# Patient Record
Sex: FEMALE | Race: WHITE | URBAN - NONMETROPOLITAN AREA
[De-identification: names, ages, dates, MRNs, and addresses within clinical notes are randomized per-mention and may not be internally consistent; named-entity substitution may affect disease eponyms.]

---

## 2019-08-21 ENCOUNTER — OFFICE VISIT (OUTPATIENT)
Dept: PSYCHIATRY | Age: 34
End: 2019-08-21
Payer: COMMERCIAL

## 2019-08-21 DIAGNOSIS — F41.1 GAD (GENERALIZED ANXIETY DISORDER): ICD-10-CM

## 2019-08-21 DIAGNOSIS — F34.1 PERSISTENT DEPRESSIVE DISORDER: Primary | ICD-10-CM

## 2019-08-21 PROCEDURE — 99203 OFFICE O/P NEW LOW 30 MIN: CPT | Performed by: NURSE PRACTITIONER

## 2019-08-21 NOTE — PROGRESS NOTES
Subjective:      Patient ID: Genie Mccauley is a 27 y.o. female. CC: Persistent Depressive D/O  AYO    HPI   Rajiv Valentino is seen for initial mental health evaluation for medication management. Reports being switched from Zoloft to Lexapro  by PCP 4 weeks ago. Rajiv Valentino did not feel the Zoloft was working. Zoloft was at 25mg daily dose. Reports she really likes the Lexapro. And feels works well for her depression and anxiety she has dealt with off and on for years. Denies feelings of harm towards self or others. Reports appetite is fair. But reports she \"binge Eats. \"  Reports sleeps well. States she would like counseling for this. Labs reviewed drawn 1-8-19 reflect no critical abnormals. Reports labs were reviewed by PCP. Past Medical Hx:  Reports allergy to Lisinopril. Denies any other known drug allergies  Reports having HTN, PCOS; Endometriosis, Migraine Headaches, Graves Disease  Reports being on Mirena for [facility]    Past Psychiatric Hx:  Denies any past psychiatric hospitalizations. Denies any past suicidal gestures. Denies ever being under care of psychiatrist. Reports did have counseling in past 2018 in [place], [place]. Reports past psych meds Rxed by PCP; Zoloft. Currently on Lexapro 10mg po q am for 4 weeks    Family Hx:  Reports father has anxiety    Social Hx:  TOBACCO: Denies use of tobacco products. ETOH: Reports drinks 1 or 2 drinks once a month  DRUGS: Reports used week recreational in college has not used for years. Denies use of any other illicit substances   MARITAL STATUS: Reports being  x5 years first marriage. Reports she and spouse get along well.    OCCUPATION: Reports works for [employer]   LEVEL OF EDUCATION: Reports has bachelors Degree in Agriculture  LIVING SITUATION: Lives in [place], [place] with  and their 2 children 3and 3years old  LEGAL:Denies any current or past legal issues     MSE:  Level of consciousness: Alert  Appearance: in chair and fair grooming

## 2019-09-25 ENCOUNTER — OFFICE VISIT (OUTPATIENT)
Dept: PSYCHIATRY | Age: 34
End: 2019-09-25
Payer: COMMERCIAL

## 2019-09-25 DIAGNOSIS — F34.1 PERSISTENT DEPRESSIVE DISORDER: Primary | ICD-10-CM

## 2019-09-25 DIAGNOSIS — E66.01 MORBID OBESITY (HCC): ICD-10-CM

## 2019-09-25 DIAGNOSIS — F41.9 ANXIETY DISORDER, UNSPECIFIED TYPE: ICD-10-CM

## 2019-09-25 DIAGNOSIS — F50.89 OTHER SPECIFIED EATING DISORDER: Primary | ICD-10-CM

## 2019-09-25 PROCEDURE — 99213 OFFICE O/P EST LOW 20 MIN: CPT | Performed by: NURSE PRACTITIONER

## 2019-09-25 PROCEDURE — 90791 PSYCH DIAGNOSTIC EVALUATION: CPT | Performed by: PSYCHOLOGIST

## 2019-09-26 ENCOUNTER — NURSE ONLY (OUTPATIENT)
Dept: LAB | Age: 34
End: 2019-09-26

## 2019-09-26 DIAGNOSIS — F34.1 PERSISTENT DEPRESSIVE DISORDER: ICD-10-CM

## 2019-09-26 LAB
ALBUMIN SERPL-MCNC: 4.4 G/DL (ref 3.5–5.1)
ALP BLD-CCNC: 75 U/L (ref 38–126)
ALT SERPL-CCNC: 21 U/L (ref 11–66)
ANION GAP SERPL CALCULATED.3IONS-SCNC: 17 MEQ/L (ref 8–16)
AST SERPL-CCNC: 21 U/L (ref 5–40)
AVERAGE GLUCOSE: 96 MG/DL (ref 70–126)
BASOPHILS # BLD: 0.4 %
BASOPHILS ABSOLUTE: 0 THOU/MM3 (ref 0–0.1)
BILIRUB SERPL-MCNC: 0.3 MG/DL (ref 0.3–1.2)
BUN BLDV-MCNC: 9 MG/DL (ref 7–22)
CALCIUM SERPL-MCNC: 9.3 MG/DL (ref 8.5–10.5)
CHLORIDE BLD-SCNC: 104 MEQ/L (ref 98–111)
CHOLESTEROL, TOTAL: 197 MG/DL (ref 100–199)
CO2: 20 MEQ/L (ref 23–33)
CREAT SERPL-MCNC: 0.8 MG/DL (ref 0.4–1.2)
EOSINOPHIL # BLD: 2.1 %
EOSINOPHILS ABSOLUTE: 0.1 THOU/MM3 (ref 0–0.4)
ERYTHROCYTE [DISTWIDTH] IN BLOOD BY AUTOMATED COUNT: 12.7 % (ref 11.5–14.5)
ERYTHROCYTE [DISTWIDTH] IN BLOOD BY AUTOMATED COUNT: 41 FL (ref 35–45)
GFR SERPL CREATININE-BSD FRML MDRD: 81 ML/MIN/1.73M2
GLUCOSE FASTING: 103 MG/DL (ref 70–108)
HBA1C MFR BLD: 5.2 % (ref 4.4–6.4)
HCT VFR BLD CALC: 43.4 % (ref 37–47)
HDLC SERPL-MCNC: 51 MG/DL
HEMOGLOBIN: 13.9 GM/DL (ref 12–16)
IMMATURE GRANS (ABS): 0.01 THOU/MM3 (ref 0–0.07)
IMMATURE GRANULOCYTES: 0.2 %
LDL CHOLESTEROL CALCULATED: 111 MG/DL
LYMPHOCYTES # BLD: 33.6 %
LYMPHOCYTES ABSOLUTE: 1.7 THOU/MM3 (ref 1–4.8)
MCH RBC QN AUTO: 28.4 PG (ref 26–33)
MCHC RBC AUTO-ENTMCNC: 32 GM/DL (ref 32.2–35.5)
MCV RBC AUTO: 88.6 FL (ref 81–99)
MONOCYTES # BLD: 7.4 %
MONOCYTES ABSOLUTE: 0.4 THOU/MM3 (ref 0.4–1.3)
NUCLEATED RED BLOOD CELLS: 0 /100 WBC
PLATELET # BLD: 200 THOU/MM3 (ref 130–400)
PMV BLD AUTO: 10.8 FL (ref 9.4–12.4)
POTASSIUM SERPL-SCNC: 4 MEQ/L (ref 3.5–5.2)
RBC # BLD: 4.9 MILL/MM3 (ref 4.2–5.4)
SEG NEUTROPHILS: 56.3 %
SEGMENTED NEUTROPHILS ABSOLUTE COUNT: 2.9 THOU/MM3 (ref 1.8–7.7)
SODIUM BLD-SCNC: 141 MEQ/L (ref 135–145)
TOTAL PROTEIN: 7.5 G/DL (ref 6.1–8)
TRIGL SERPL-MCNC: 173 MG/DL (ref 0–199)
TSH SERPL DL<=0.05 MIU/L-ACNC: 4.6 UIU/ML (ref 0.4–4.2)
VITAMIN D 25-HYDROXY: 18 NG/ML (ref 30–100)
WBC # BLD: 5.2 THOU/MM3 (ref 4.8–10.8)

## 2019-10-01 PROBLEM — F50.89 OTHER SPECIFIED EATING DISORDER: Status: ACTIVE | Noted: 2019-10-01

## 2019-10-01 PROBLEM — F41.9 ANXIETY DISORDER: Status: ACTIVE | Noted: 2019-10-01

## 2019-10-09 ENCOUNTER — OFFICE VISIT (OUTPATIENT)
Dept: PSYCHIATRY | Age: 34
End: 2019-10-09
Payer: COMMERCIAL

## 2019-10-09 DIAGNOSIS — E66.01 MORBID OBESITY (HCC): ICD-10-CM

## 2019-10-09 DIAGNOSIS — F41.9 ANXIETY DISORDER, UNSPECIFIED TYPE: ICD-10-CM

## 2019-10-09 DIAGNOSIS — F50.89 OTHER SPECIFIED EATING DISORDER: Primary | ICD-10-CM

## 2019-10-09 PROCEDURE — 90834 PSYTX W PT 45 MINUTES: CPT | Performed by: PSYCHOLOGIST

## 2019-10-13 PROBLEM — R10.9 ABDOMINAL PAIN: Status: ACTIVE | Noted: 2019-10-13

## 2019-10-13 PROBLEM — O24.419 GESTATIONAL DIABETES MELLITUS (GDM) IN THIRD TRIMESTER: Status: ACTIVE | Noted: 2018-01-12

## 2019-10-13 PROBLEM — E78.5 HYPERLIPIDEMIA: Status: ACTIVE | Noted: 2019-10-13

## 2019-10-13 PROBLEM — E03.9 HYPOTHYROIDISM: Status: ACTIVE | Noted: 2019-10-13

## 2019-10-13 PROBLEM — H69.90 EUSTACHIAN TUBE DISORDER: Status: ACTIVE | Noted: 2019-10-13

## 2019-10-13 PROBLEM — I10 ESSENTIAL HYPERTENSION: Status: ACTIVE | Noted: 2019-10-13

## 2019-10-13 PROBLEM — E05.00 GRAVES' DISEASE: Status: ACTIVE | Noted: 2019-07-23

## 2019-10-13 PROBLEM — F41.1 GENERALIZED ANXIETY DISORDER: Status: ACTIVE | Noted: 2017-11-13

## 2019-10-30 ENCOUNTER — OFFICE VISIT (OUTPATIENT)
Dept: PSYCHIATRY | Age: 34
End: 2019-10-30
Payer: COMMERCIAL

## 2019-10-30 DIAGNOSIS — F34.1 PERSISTENT DEPRESSIVE DISORDER: Primary | ICD-10-CM

## 2019-10-30 DIAGNOSIS — F41.1 GAD (GENERALIZED ANXIETY DISORDER): ICD-10-CM

## 2019-10-30 PROCEDURE — 99213 OFFICE O/P EST LOW 20 MIN: CPT | Performed by: NURSE PRACTITIONER

## 2019-10-30 RX ORDER — ESCITALOPRAM OXALATE 20 MG/1
20 TABLET ORAL DAILY
Qty: 30 TABLET | Refills: 1 | Status: SHIPPED | OUTPATIENT
Start: 2019-10-30 | End: 2019-12-04 | Stop reason: SDUPTHER

## 2019-11-04 ENCOUNTER — OFFICE VISIT (OUTPATIENT)
Dept: PSYCHIATRY | Age: 34
End: 2019-11-04
Payer: COMMERCIAL

## 2019-11-04 DIAGNOSIS — F60.5 OBSESSIVE COMPULSIVE PERSONALITY DISORDER (HCC): Primary | ICD-10-CM

## 2019-11-04 DIAGNOSIS — E66.01 MORBID OBESITY (HCC): ICD-10-CM

## 2019-11-04 DIAGNOSIS — F50.89 OTHER SPECIFIED EATING DISORDER: ICD-10-CM

## 2019-11-04 PROCEDURE — 90834 PSYTX W PT 45 MINUTES: CPT | Performed by: PSYCHOLOGIST

## 2019-12-04 ENCOUNTER — OFFICE VISIT (OUTPATIENT)
Dept: PSYCHIATRY | Age: 34
End: 2019-12-04
Payer: COMMERCIAL

## 2019-12-04 DIAGNOSIS — F41.1 GAD (GENERALIZED ANXIETY DISORDER): ICD-10-CM

## 2019-12-04 DIAGNOSIS — F50.89 OTHER SPECIFIED EATING DISORDER: ICD-10-CM

## 2019-12-04 DIAGNOSIS — F34.1 PERSISTENT DEPRESSIVE DISORDER: ICD-10-CM

## 2019-12-04 DIAGNOSIS — F60.5 OBSESSIVE COMPULSIVE PERSONALITY DISORDER (HCC): Primary | ICD-10-CM

## 2019-12-04 PROBLEM — R10.9 ABDOMINAL PAIN: Status: RESOLVED | Noted: 2019-10-13 | Resolved: 2019-12-04

## 2019-12-04 PROCEDURE — 99213 OFFICE O/P EST LOW 20 MIN: CPT | Performed by: NURSE PRACTITIONER

## 2019-12-04 PROCEDURE — 90834 PSYTX W PT 45 MINUTES: CPT | Performed by: PSYCHOLOGIST

## 2019-12-04 RX ORDER — ESCITALOPRAM OXALATE 20 MG/1
20 TABLET ORAL DAILY
Qty: 30 TABLET | Refills: 2 | Status: SHIPPED | OUTPATIENT
Start: 2019-12-04 | End: 2020-01-15

## 2020-01-03 ENCOUNTER — TELEPHONE (OUTPATIENT)
Dept: PSYCHIATRY | Age: 35
End: 2020-01-03

## 2020-01-04 NOTE — TELEPHONE ENCOUNTER
Tell her decrease dose of Lexapro to 10mg take half tab until seen by me. On Jan 15th. If problems worsen tell her to go to ER.

## 2020-01-15 ENCOUNTER — OFFICE VISIT (OUTPATIENT)
Dept: PSYCHIATRY | Age: 35
End: 2020-01-15
Payer: COMMERCIAL

## 2020-01-15 PROCEDURE — 99213 OFFICE O/P EST LOW 20 MIN: CPT | Performed by: NURSE PRACTITIONER

## 2020-01-15 RX ORDER — FLUOXETINE 10 MG/1
10 CAPSULE ORAL DAILY
Qty: 30 CAPSULE | Refills: 1 | Status: SHIPPED | OUTPATIENT
Start: 2020-01-15 | End: 2020-02-05 | Stop reason: SDUPTHER

## 2020-01-15 NOTE — PROGRESS NOTES
substances   MARITAL STATUS: Reports being  x5 years first marriage. Reports she and spouse get along well.   OCCUPATION: Reports works for "[employer] has bachelors Degree in [facility] in [place], [place] with  and their 2 children 3and 3years old  LEGAL:Denies any current or past legal issues      MSE:  Level of consciousness: Alert  Appearance: in chair and fair grooming   Behavior/Motor: no abnormalities noted   Attitude toward examiner: cooperative   Speech: Normal volume, goal directed, NRR  Mood: Euthymic  Affect: Reactive  Thought processes: Linear and goal directed   Suicidal Ideation: Denies suicidal ideations  Homicidal ideation: Denies homicidal ideations  Delusions: No evidence of delusions is observed  Perceptual Disturbance: Denies AH/VH;  No evidence of psychosis is observed. Cognition: Oriented to person, place, time and situation   Concentration fair   Memory intact   Insight: Fair  Judgment: Fair     ROS:  Constitutional:  He appears well-developed and well-nourished, no acute distress  HENT:   Head: Normocephalic and atraumatic. Negative for ear pain, congestion, rhinorrhea and neck pain. Eyes: Conjunctivae are normal. Right eye exhibits no discharge. Left eye exhibits no discharge. No scleral icterus. Pulmonary/Chest:  No respiratory distress or accessory muscle use, no wheezing. Musculoskeletal: Normal range of motion. He exhibits no edema.  Negative for myalgias, back pain and arthralgias. Neurological: cranial nerves II-XII grossly in tact, normal gait and station. Negative for dizziness, weakness or headaches. Skin: Skin appears  dry.  is not diaphoretic. No erythema. Cardiovascular: Negative for chest pain, palpitations and leg swelling. Gastrointestinal: Negative for nausea, vomiting and diarrhea.    Genitourinary: Negative for dysuria and frequency.      Impression:  Persistent Depressive D/O  AYO  OCD     Plan:  D/C Lexapro  Start Prozac 10mg po q am for obsessions and ruminating thoughts. Med education given. Client voiced understanding   Advised to continue individual counseling. Will provide letter to request position switch.    RTC 4 weeks   Review of Systems    Objective:   Physical Exam    Assessment:          Plan:              EDITH Williamson - CNP

## 2020-02-05 ENCOUNTER — OFFICE VISIT (OUTPATIENT)
Dept: PSYCHIATRY | Age: 35
End: 2020-02-05
Payer: COMMERCIAL

## 2020-02-05 PROCEDURE — 90834 PSYTX W PT 45 MINUTES: CPT | Performed by: PSYCHOLOGIST

## 2020-02-05 PROCEDURE — 99213 OFFICE O/P EST LOW 20 MIN: CPT | Performed by: NURSE PRACTITIONER

## 2020-02-05 RX ORDER — FLUOXETINE 10 MG/1
10 CAPSULE ORAL DAILY
Qty: 30 CAPSULE | Refills: 1 | Status: SHIPPED | OUTPATIENT
Start: 2020-02-05 | End: 2020-04-20 | Stop reason: DRUGHIGH

## 2020-02-05 NOTE — PROGRESS NOTES
Subjective:      Patient ID: Isaac Peres is a 27 y.o. female. CC: Persistent Depressive D/O  AYO  OCD      HPI  Karol Valverde is seen for F/U  for medication management.   Reports start of Prozac really helped with  obsessive and ruminating  thoughts. Denies side effects from med. Good med compliance is reported. Denies depression.  Denies feelings of harm towards self or others.  Reports appetite is fair. Reports stress at work is much less since getting a less stressful position as reasonable accomadation letter was given.   Reports still seeing  Dr Angelica Yusuf in counseling. Reports still binge eating. Labs reviewed drawn 10- reflect elevated TSH at 4.600. Also Low Vitamin D level at 18. Reports these issues are being addressed by endocrinologist and PCP. Reports continues on Vit. D      It is to be noted client asked for off work letter which we discussed at length and was felt by this provider would be counter productive to her treatment. Client then stated she may have to get another provider as she has difficulties finding a .      Past Medical Hx:  Reports allergy to Lisinopril. Denies any other known drug allergies  Reports having HTN, PCOS; Endometriosis, Migraine Headaches, Graves Disease  Reports being on Mirena for [facility]     Past Psychiatric Hx:  Denies any past psychiatric hospitalizations. Denies any past suicidal gestures. Denies ever being under care of psychiatrist. Reports did have counseling in past 2018 in [place], [place]. Reports past psych meds Rxed by PCP; Zoloft. Currently on Lexapro 10mg po q am for 4 weeks     Family Hx:  Reports father has anxiety     Social Hx:  TOBACCO: Denies use of tobacco products.   ETOH: Reports drinks 1 or 2 drinks once a month  DRUGS: Reports used week recreational in college has not used for years. Denies use of any other illicit substances   MARITAL STATUS: Reports being  x5 years first marriage.  Reports she and spouse get along well.   OCCUPATION: Reports

## 2020-02-06 NOTE — PROGRESS NOTES
PSYCHOLOGICAL FOLLOW-UP FOR binge eating      History of Presenting Illness:   Kaushik Alexis a 48-year-old, female, referred for a psychiatric evaluation for binge eating by Jennie Luna CNP.  Today is her third follow up session.     Assessment:  Patient reports that she recently started Prozac and has found this useful. She reports she overall feels better, has less anger, and feels less detached from daily living. She reports she is coping by writing in her journal (good and bad days), and reports this helps her brain feel \"less full. \" She also reports that her binge eating episodes are continuing to improve. Patient reports starting Noom, psychologically based weight loss patience, and checks in daily with her . She reports this helps hold her more accountable. Patient reports there is overlap between Noom and psychoeducation provided during psychotherapy sessions. Patient reports she is now able to stop and think about what she is eating to help avoid binges, and over eating. She has also started to log her food intake before she eats and finds this useful. Patient reports having a goal to where the same bathing suit she did on her honeymoon, and feels that this is a realistic goal for her in approximately 1 year. Additionally, she reports she is trying to recognize and set up appropriate boundaries so she does not feel overworked, and \"tapped out. \"     MENTAL STATUS EXAM   General appearance: dressed appropriately, well-groomed  Attitude & Christyne Asper and cooperative  Motor Activity & Musculoskeletal: no abnormal movements  Speech & Language: normal rate, volume, and prosody  Gait & Station: normal  Mood: somewhat sad  Affect: congruent with mood, appropriate to setting  Thought content: appropriate  Suicidal ideation: denies  Homicidal ideation: denies  Thought process & Associations: logical, goal oriented  Perceptions: normal  Orientation: to person, place, and time  Attention &

## 2020-04-20 ENCOUNTER — VIRTUAL VISIT (OUTPATIENT)
Dept: PSYCHIATRY | Age: 35
End: 2020-04-20
Payer: COMMERCIAL

## 2020-04-20 PROCEDURE — 90792 PSYCH DIAG EVAL W/MED SRVCS: CPT | Performed by: NURSE PRACTITIONER

## 2020-04-20 RX ORDER — FLUOXETINE HYDROCHLORIDE 20 MG/1
20 CAPSULE ORAL DAILY
Qty: 30 CAPSULE | Refills: 0 | Status: SHIPPED | OUTPATIENT
Start: 2020-04-20 | End: 2020-05-18 | Stop reason: SDUPTHER

## 2020-04-20 RX ORDER — HYDROXYZINE PAMOATE 25 MG/1
25 CAPSULE ORAL 3 TIMES DAILY PRN
Qty: 30 CAPSULE | Refills: 0 | Status: SHIPPED | OUTPATIENT
Start: 2020-04-20 | End: 2020-05-18 | Stop reason: SDUPTHER

## 2020-04-20 RX ORDER — LABETALOL 100 MG/1
100 TABLET, FILM COATED ORAL 2 TIMES DAILY
COMMUNITY

## 2020-04-20 SDOH — ECONOMIC STABILITY: TRANSPORTATION INSECURITY
IN THE PAST 12 MONTHS, HAS THE LACK OF TRANSPORTATION KEPT YOU FROM MEDICAL APPOINTMENTS OR FROM GETTING MEDICATIONS?: NO

## 2020-04-20 SDOH — ECONOMIC STABILITY: FOOD INSECURITY: WITHIN THE PAST 12 MONTHS, YOU WORRIED THAT YOUR FOOD WOULD RUN OUT BEFORE YOU GOT MONEY TO BUY MORE.: NEVER TRUE

## 2020-04-20 SDOH — ECONOMIC STABILITY: INCOME INSECURITY: HOW HARD IS IT FOR YOU TO PAY FOR THE VERY BASICS LIKE FOOD, HOUSING, MEDICAL CARE, AND HEATING?: NOT HARD AT ALL

## 2020-04-20 SDOH — ECONOMIC STABILITY: FOOD INSECURITY: WITHIN THE PAST 12 MONTHS, THE FOOD YOU BOUGHT JUST DIDN'T LAST AND YOU DIDN'T HAVE MONEY TO GET MORE.: NEVER TRUE

## 2020-04-20 SDOH — ECONOMIC STABILITY: TRANSPORTATION INSECURITY
IN THE PAST 12 MONTHS, HAS LACK OF TRANSPORTATION KEPT YOU FROM MEETINGS, WORK, OR FROM GETTING THINGS NEEDED FOR DAILY LIVING?: NO
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
## 2020-05-05 ENCOUNTER — VIRTUAL VISIT (OUTPATIENT)
Dept: PSYCHIATRY | Age: 35
End: 2020-05-05
Payer: COMMERCIAL

## 2020-05-05 PROCEDURE — 90834 PSYTX W PT 45 MINUTES: CPT | Performed by: PSYCHOLOGIST

## 2020-05-18 ENCOUNTER — VIRTUAL VISIT (OUTPATIENT)
Dept: PSYCHIATRY | Age: 35
End: 2020-05-18
Payer: COMMERCIAL

## 2020-05-18 PROCEDURE — 90833 PSYTX W PT W E/M 30 MIN: CPT | Performed by: NURSE PRACTITIONER

## 2020-05-18 PROCEDURE — 99213 OFFICE O/P EST LOW 20 MIN: CPT | Performed by: NURSE PRACTITIONER

## 2020-05-18 RX ORDER — FLUOXETINE HYDROCHLORIDE 20 MG/1
20 CAPSULE ORAL DAILY
Qty: 30 CAPSULE | Refills: 1 | Status: SHIPPED | OUTPATIENT
Start: 2020-05-18 | End: 2020-07-15 | Stop reason: SDUPTHER

## 2020-05-18 RX ORDER — HYDROXYZINE PAMOATE 25 MG/1
25 CAPSULE ORAL 3 TIMES DAILY PRN
Qty: 90 CAPSULE | Refills: 1 | Status: SHIPPED | OUTPATIENT
Start: 2020-05-18 | End: 2020-11-18 | Stop reason: SDUPTHER

## 2020-05-22 ENCOUNTER — VIRTUAL VISIT (OUTPATIENT)
Dept: PSYCHIATRY | Age: 35
End: 2020-05-22
Payer: COMMERCIAL

## 2020-05-22 PROCEDURE — 90834 PSYTX W PT 45 MINUTES: CPT | Performed by: PSYCHOLOGIST

## 2020-06-09 ENCOUNTER — VIRTUAL VISIT (OUTPATIENT)
Dept: PSYCHIATRY | Age: 35
End: 2020-06-09
Payer: COMMERCIAL

## 2020-06-09 PROCEDURE — 90834 PSYTX W PT 45 MINUTES: CPT | Performed by: PSYCHOLOGIST

## 2020-06-09 NOTE — PROGRESS NOTES
Pursuant to the emergency declaration under the [facility], [facility] waiver authority and the [employer] Act, this Virtual Visit was conducted, with patient's consent, to reduce the patient's risk of exposure to COVID-19 and provide continuity of care for an established patient.    Services were provided through a video synchronous discussion virtually to substitute for in-person clinic visit.

## 2020-07-01 ENCOUNTER — VIRTUAL VISIT (OUTPATIENT)
Dept: PSYCHIATRY | Age: 35
End: 2020-07-01
Payer: COMMERCIAL

## 2020-07-01 PROCEDURE — 90832 PSYTX W PT 30 MINUTES: CPT | Performed by: PSYCHOLOGIST

## 2020-07-01 NOTE — PROGRESS NOTES
session. MENTAL STATUS EXAM   General appearance: dressed appropriately, well-groomed  Attitude & Behavior: pleasant and cooperative  Motor Activity & Musculoskeletal: no abnormal movements  Speech & Language: normal rate, volume, and prosody  Gait & Station: sitting  Mood: content   Affect: congruent with mood, appropriate to setting  Thought content: appropriate  Suicidal ideation: denies  Homicidal ideation: denies  Thought process & Associations: logical, goal oriented  Perceptions: normal  Orientation: to person, place, and time  Attention & Concentration: intact  Fund of knowledge: average  Insight: adequate  Judgment: adequate     DSM DIAGNOSIS:  Obsessive compulsive personality disorder  Other specified eating disorder, binge eating occurs less than once per week       INTERVENTION:   Provided positive reinforcement for patient making a plan and overall sticking to her plan. Provided positive reinforcement for her having a serious conversation with her . SUMMARY/PLAN:  Patient is actively engaged in session and is making progress. She has good insight into her thoughts and behaviors, and is able to verbalize her concerns. Patient scheduled an additional follow up on August 12 with writer.        Psychology Clinician:  Elizabeth Keith, PhD     Start time: 12:35pm  End time: 1:07pm

## 2020-07-15 ENCOUNTER — VIRTUAL VISIT (OUTPATIENT)
Dept: PSYCHIATRY | Age: 35
End: 2020-07-15
Payer: COMMERCIAL

## 2020-07-15 PROCEDURE — 99213 OFFICE O/P EST LOW 20 MIN: CPT | Performed by: NURSE PRACTITIONER

## 2020-07-15 PROCEDURE — 90836 PSYTX W PT W E/M 45 MIN: CPT | Performed by: NURSE PRACTITIONER

## 2020-07-15 RX ORDER — FLUOXETINE HYDROCHLORIDE 20 MG/1
20 CAPSULE ORAL DAILY
Qty: 30 CAPSULE | Refills: 2 | Status: SHIPPED | OUTPATIENT
Start: 2020-07-15 | End: 2020-10-07 | Stop reason: DRUGHIGH

## 2020-07-15 NOTE — PROGRESS NOTES
[address] [place] [address] [address] [facility] [address]  Dept: [phone]  Dept Fax: [phone]: [phone]    Visit Date: 7/15/2020     TELEPSYCHIATRY VISIT -- Audio/Visual (During CZMOH-89 public health emergency)    Chai Eason is a 38 y.o. female being evaluated by a Virtual Visit (video visit) encounter to address concerns as mentioned  below. A caregiver was present when appropriate. Pursuant to the emergency declaration under the [address] [place] [address], [address] [place] [address] authority and the [employer] Act, this Virtual Visit was conducted with patient's (and/or legal guardian's) consent, to reduce the patient's risk of exposure to COVID-19 and provide necessary medical care. The patient (and/or legal guardian) has also been advised to contact this office for worsening conditions or problems, and seek emergency medical treatment and/or call 911 if deemed necessary. Services were provided through a video synchronous discussion virtually to substitute for in-person clinic visit. Patient and provider were located at their individual homes. SUBJECTIVE DATA     CHIEF COMPLAINT:    Chief Complaint   Patient presents with    Depression    Anxiety    Follow-up       History obtained from: patient    HISTORY OF PRESENT ILLNESS:    Chai Eason is a 38 y.o. female who presents via tele-health audiovisual visit to follow-up on complaints of depression and anxiety. Her last visit was 05/18/2020 as a telehealth visit.     Patient states it is getting easier to identify triggers  -Easier to identify when she needs to take time  -Been reading more on PCOS and the impact on the body  -states she has recognized how cortisol releases at high levels during stress and this impacts mood    States she has begun to identify \"life values\"  -what matters to improve self and how to get to live life following these values  -she has been working on this with Dr. Argie Osgood  -states she is getting up earlier to give self some extra time to get ready and get up and get prepared for the day    States she has made a list of chores and a \"to-do\" list to help her stay focused and to help her be okay with taking time for self. States she has unrealistic expectations of her young children  -states \"I set myself up for failure and disappointment because I have unrealistic expectations. \"  -states this causes her to \"either loose my shit or shutting down\"   -admits she struggles with allowing self emotions and honoring the emotions of others  -When she gets upset she goes straight to anger, and this is very frustrating for her \"and I turn into a big pouting child\"    Has recognized a lot of negative behaviors and is able to stop them easier    Recognizes she was using anger to manage stress  -states she realized this when on a recent trip to her mother's house    Reading \"rising strong\" by Donna Merritt self day-dreaming a lot, which is leading to unrealistic expectations    Denies suicidal ideations, intent, plan. No homicidal ideations, intent, plan. No audiovisual hallucinations. HPI    Adverse reactions from psychotropic medications:  None current      Current Psychiatric Review of Systems         Tish or Hypomania:  None      Panic Attacks:  no     Phobias:  no     Obsessions and Compulsions:  no     Body or Vocal Tics:  no     Hallucinations:  no     Delusions:  no    SOCIAL HISTORY:  Patient was born in [place], [place] and raised by her biological parents      Social History     Socioeconomic History    Marital status:      Spouse name: Kelin Molina Number of children: 2    Years of education: Not on file    Highest education level:  Bachelor's degree (e.g., BA, AB, BS)   Occupational History    Occupation: [employer]      Comment: [employer] 9 worse while others improve mood        FAMILY HISTORY:   Family History   Problem Relation Age of Onset    Diabetes Mother     Hypertension Father     Heart Disease Father     Cancer Father         bladder    Mult Sclerosis Sister     Anxiety Disorder Paternal Grandfather     Bipolar Disorder Paternal Grandfather        Psychiatric Family History  As noted above    PAST MEDICAL HISTORY:    Past Medical History:   Diagnosis Date    Chronic back pain     Gestational diabetes     during 2nd pregnancy    Graves disease     Hypertension     Migraine     PCOS (polycystic ovarian syndrome)        PAST SURGICAL HISTORY:    Past Surgical History:   Procedure Laterality Date     SECTION      x2       PREVIOUSMEDICATIONS:  Outpatient Medications Prior to Visit   Medication Sig Dispense Refill    hydrOXYzine (VISTARIL) 25 MG capsule Take 1 capsule by mouth 3 times daily as needed for Anxiety 90 capsule 1    labetalol (NORMODYNE) 100 MG tablet Take 100 mg by mouth 2 times daily      FLUoxetine (PROZAC) 20 MG capsule Take 1 capsule by mouth daily 30 capsule 1     No facility-administered medications prior to visit. ALLERGIES:    Lisinopril    REVIEW OF SYSTEMS:    Review of Systems    The patient sees No primary care provider on file. as her primary care provider. SPECIALISTS: Endocrinologist (Grave's Disease)    OBJECTIVE DATA     There were no vitals taken for this visit. Physical Exam    Mental Status Evaluation:   Orientation: Alert, oriented, thought content appropriate   Mood:. Euthymic      Affect:  Normal      Appearance:  Age Appropriate, Casually Dressed, Overweight, Clean, Well Groomed, Clothing Appropriate for Age and Clothing Appropriate for Weather   Activity:  Cooperative, Good Eye Contact and Seated Calmly   Gait/Posture: Normal   Speech:  Clear, Fluent, Normal Pitch and Volume, Age and Situation Appropriate   Thought Process: Within Normal Limits   Thought Content:   Within Normal Limits   Cognition:  Grossly Intact   Memory: Intact   Insight:  Good   Judgment: Good   Suicidal Ideations: Denies Suicidal Ideation   Homicidal Ideations: Negative for homicidal ideation   Medication Side Effects: Absent       Attention Span Attention span and concentration were age appropriate       Screenings Completed in This Encounter:     Anxiety and Depression:                    DIAGNOSIS AND ASSESSMENT DATA     DIAGNOSIS:   1. Mild episode of recurrent major depressive disorder ([facility]    2. Generalized anxiety disorder    3. Obsessive compulsive personality disorder ([facility]      R/O Bipolar disorder; MDD with mixed features    PLAN   Follow-up:  Return in about 3 months (around 10/15/2020), or if symptoms worsen or fail to improve, for follow-up and medication management. Prescriptions for this encounter:  New Prescriptions    No medications on file       Orders Placed This Encounter   Medications    FLUoxetine (PROZAC) 20 MG capsule     Sig: Take 1 capsule by mouth daily     Dispense:  30 capsule     Refill:  2       Medications Discontinued During This Encounter   Medication Reason    FLUoxetine (PROZAC) 20 MG capsule REORDER       Additional orders:  No orders of the defined types were placed in this encounter. Patient is reporting again feeling better since her last visit. Tolerating medications well. No changes will be made. Refills are provided. Supportive therapy provided. Assisted patient in identifying strategies to review stressors and manage anxiety. She is encouraged to continue to participate in individual psychotherapy. Identified importance of recognizing realistic expectations. Patient is encouraged to utilize nonpharmacologic coping skills such as deep breathing, guided imagery, guided meditation, muscle relaxation, calming music, and/or journaling. Risks, potential side effects, possibledrug-drug interactions, benefits and alternate treatments discussed in detail. All questions answered. Patient stated understanding and is agreeable to treatment plan. Patient has been instructed to seek emergency help via the emergency and/or calling 911 should symptoms become severe, worsen, or with other concerning symptoms. Patient instructed to goimmediately to the emergency room and/or call 911 with any suicidal or homicidal ideations or if audio/visual hallucinations develop  Patient stated understanding and agrees. Patient given crisis center information. I spent a total of 38 minutes with the patient in counseling regarding topics discussed above. Utilized CBT, motivational interviewing, and reflective listening to address topics above. Patient responsive and engaged throughout session. The remainder session was spent on symptom evaluation and medication management. Provider Signature:  Electronically signed by EDITH Sims CNP on 7/15/2020 at 10:48 AM    **This report has been created using voice recognition software. It may contain minor errors which are inherent in voice recognition technology. **

## 2020-08-12 ENCOUNTER — VIRTUAL VISIT (OUTPATIENT)
Dept: PSYCHIATRY | Age: 35
End: 2020-08-12
Payer: COMMERCIAL

## 2020-08-12 PROCEDURE — 90834 PSYTX W PT 45 MINUTES: CPT | Performed by: PSYCHOLOGIST

## 2020-08-12 NOTE — PROGRESS NOTES
TELEPSYCHOLOGY VISIT -- Audio/Visual (During PTVCI-64 public health emergency)     This session was conducted as a telepsychology visit due to one or more of the following COVID-19 risk factors being present in this patient:  · The patient is aged 61 or older  · The patient reports chronic health problems  · The patient reports immune suppressed or immune compromised status  · The patient reports being at risk or potentially exposed to the virus     Patient Location: Home     Provider Location: [facility]      Patient gave verbal consent for teleservices.         This virtual visit was conducted via interactive/real-time [address] and eating behaviors     History of Presenting Illness:   Piper Esquivel a 37 year-old, female, referred for a psychiatric evaluation for binge eating by Faraz Haley CNP.  She is currently following with Marlena Hoyos CNP for medication management. She is following up with psychology to learn adaptive coping strategies to manage stress, mood, and binge eating. Assessment:  She reports the past week has been \"kind of rough\" related to work stress and is unsure how to interpret messages from her boss. She reports these statements have led to continued mistrust of her supervisors/management, and has led to a perceived drop in work performance. She reports feeling as though she is being targeted and it is negatively impacting her. She reports that she was previously removed from one office in 2018 and was told that she is not allowed to communicate with anyone from that office for supposed safety concerns. Eating behaviors: she reports she did engage in some emotional eating to the point of feeling full. She reports she was able to manage her food cravings and stopped herself from eating ice cream, even though she did have a craving for it. Mood: she reports her relationship with her children has significantly improved.   She reports she has had much more manageable levels of stress, and she has been enjoying family time. MENTAL STATUS EXAM   General appearance: dressed appropriately, well-groomed  Attitude & Behavior: pleasant and cooperative  Motor Activity & Musculoskeletal: no abnormal movements  Speech & Language: normal rate, volume, and prosody  Gait & Station: sitting  Mood: calm, some anger regarding work situation  Affect: congruent with mood, appropriate to setting  Thought content: appropriate  Suicidal ideation: denies  Homicidal ideation: denies  Thought process & Associations: logical, goal oriented  Perceptions: normal  Orientation: to person, place, and time  Attention & Concentration: intact  Fund of knowledge: average  Insight: adequate  Judgment: adequate     DSM DIAGNOSIS:  Obsessive compulsive personality disorder  Other specified eating disorder, binge eating occurs less than once per week       INTERVENTION:   Processed new work related situation and walked through problematic questions worksheet. Challenged patient's beliefs of possible change in status and the pros and cons of possible change in status. Patient is very concerned about her reputation throughout her employer. SUMMARY/PLAN:  Based on patient's concerns it was recommended that look into the [place] [employer], workplace discrimination unit. Writer provided patient with website to further investigate if this would be an appropriate option for her. Patient did schedule a follow up in approximately 6 weeks. Patient is in agreement with plan.      Psychology Clinician:  Chapis Rios, PhD     Start time: 12:30pm  End time: 1:20pm      Pursuant to the emergency declaration under the [facility], [facility] waiver authority and the [employer] Act, this Virtual Visit was conducted, with patient's consent, to reduce the patient's risk of exposure to COVID-19 and provide continuity of care for an established patient. Services were provided through a video synchronous discussion virtually to substitute for in-person clinic visit.

## 2020-09-23 ENCOUNTER — VIRTUAL VISIT (OUTPATIENT)
Dept: PSYCHIATRY | Age: 35
End: 2020-09-23
Payer: COMMERCIAL

## 2020-09-23 PROCEDURE — 90834 PSYTX W PT 45 MINUTES: CPT | Performed by: PSYCHOLOGIST

## 2020-09-23 NOTE — PROGRESS NOTES
TELEPSYCHOLOGY VISIT -- Audio/Visual (During ALUTC-21 public health emergency)     This session was conducted as a telepsychology visit due to one or more of the following COVID-19 risk factors being present in this patient:  · The patient is aged 61 or older  · The patient reports chronic health problems  · The patient reports immune suppressed or immune compromised status  · The patient reports being at risk or potentially exposed to the virus     Patient Location: Home     Provider Location: [facility]      Patient gave verbal consent for teleservices.         This virtual visit was conducted via interactive/real-time [address] and eating behaviors     History of Presenting Illness:   Rose Guzman a 37 year-old, female, referred for a psychiatric evaluation for binge eating by Faheem Wang CNP.  She is currently following with Arcadio Murray CNP for medication management. She is following up with psychology to learn adaptive coping strategies to manage stress, mood, and binge eating. Assessment:  She reports she has overall been doing well. She reports she did re-engage in [place] with a friend. She also had a meeting with the HR department at work. She reports this meeting was not useful. She reports it is very difficult for her to process these emotions. She reports she is continuing to meet obligations at work despite this. She reports she has been busy with construction of new home and she is excited for this. She reports this has also led to feeling divided with her spouse as every night her  is working on the house so she is alone. She reports she has felt \"pretty good\" over the past couple of days with work and daily life. She reports she has been using Vistaril more often as the end of the year at work wraps up. Psychiatric Medications: she reports these have been helpful.   She notices that she has been more stressed as of lately and has been managing by taking Vistaril more often. She reports that she can \"definitely\" tell that the Prozac has been helpful for her. She notices her mood swings are much worse in the morning if she does not take this medication. MENTAL STATUS EXAM   General appearance: dressed appropriately, well-groomed  Attitude & Behavior: pleasant and cooperative  Motor Activity & Musculoskeletal: no abnormal movements  Speech & Language: normal rate, volume, and prosody  Gait & Station: sitting  Mood: somewhat anxious with work   Affect: congruent with mood, appropriate to setting  Thought content: appropriate  Suicidal ideation: denies  Homicidal ideation: denies  Thought process & Associations: logical, goal oriented, coherent  Perceptions: normal  Orientation: to person, place, and time  Attention & Concentration: intact  Fund of knowledge: average  Insight: adequate  Judgment: adequate     DSM DIAGNOSIS:  Obsessive compulsive personality disorder  Other specified eating disorder, binge eating occurs less than once per week       INTERVENTION:   Provided positive reinforcement for healthy habits patient has been able to maintain such as making a list to alleviate stress. Patient is able to acknowledge that change in job position is going to be a good thing because it will be less work with same compensation. She appears to be more accepting of things that are out of her control. Discussed the ideas of generalization and specificity. SUMMARY/PLAN:   Patient appears to be more accepting of things that are out of her control. She is trying to continue to practice this idea but does have difficulties at times. She is motivated to continue to improve upon these behaviors. She has a follow up session scheduled with writer in December.         Psychology Clinician:  Chapis Rios, PhD     Start time: 10:00 am  End time: 10:40 am      Pursuant to the emergency declaration under the [facility] Act, 1135 waiver authority and the Coronavirus Preparedness and Response Supplemental Appropriations Act, this Virtual Visit was conducted, with patient's consent, to reduce the patient's risk of exposure to COVID-19 and provide continuity of care for an established patient. Services were provided through a video synchronous discussion virtually to substitute for in-person clinic visit.

## 2020-10-07 ENCOUNTER — VIRTUAL VISIT (OUTPATIENT)
Dept: PSYCHIATRY | Age: 35
End: 2020-10-07
Payer: COMMERCIAL

## 2020-10-07 PROCEDURE — 90833 PSYTX W PT W E/M 30 MIN: CPT | Performed by: NURSE PRACTITIONER

## 2020-10-07 PROCEDURE — 99214 OFFICE O/P EST MOD 30 MIN: CPT | Performed by: NURSE PRACTITIONER

## 2020-10-07 RX ORDER — FLUOXETINE HYDROCHLORIDE 40 MG/1
40 CAPSULE ORAL DAILY
Qty: 90 CAPSULE | Refills: 0 | Status: SHIPPED | OUTPATIENT
Start: 2020-10-07 | End: 2020-11-18 | Stop reason: SDUPTHER
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
## 2020-10-15 ENCOUNTER — NURSE ONLY (OUTPATIENT)
Dept: LAB | Age: 35
End: 2020-10-15

## 2020-11-18 ENCOUNTER — VIRTUAL VISIT (OUTPATIENT)
Dept: PSYCHIATRY | Age: 35
End: 2020-11-18
Payer: COMMERCIAL

## 2020-11-18 PROCEDURE — 99213 OFFICE O/P EST LOW 20 MIN: CPT | Performed by: NURSE PRACTITIONER

## 2020-11-18 PROCEDURE — 90833 PSYTX W PT W E/M 30 MIN: CPT | Performed by: NURSE PRACTITIONER

## 2020-11-18 RX ORDER — FLUOXETINE HYDROCHLORIDE 40 MG/1
40 CAPSULE ORAL DAILY
Qty: 90 CAPSULE | Refills: 0 | Status: SHIPPED | OUTPATIENT
Start: 2020-11-18 | End: 2021-02-10 | Stop reason: DRUGHIGH

## 2020-11-18 RX ORDER — HYDROXYZINE PAMOATE 25 MG/1
25 CAPSULE ORAL 3 TIMES DAILY PRN
Qty: 90 CAPSULE | Refills: 1 | Status: SHIPPED | OUTPATIENT
Start: 2020-11-18 | End: 2021-05-19 | Stop reason: SDUPTHER
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
## 2021-02-10 ENCOUNTER — VIRTUAL VISIT (OUTPATIENT)
Dept: PSYCHIATRY | Age: 36
End: 2021-02-10
Payer: COMMERCIAL

## 2021-02-10 DIAGNOSIS — F41.1 GAD (GENERALIZED ANXIETY DISORDER): ICD-10-CM

## 2021-02-10 DIAGNOSIS — F60.5 OBSESSIVE COMPULSIVE PERSONALITY DISORDER (HCC): ICD-10-CM

## 2021-02-10 DIAGNOSIS — F33.1 MODERATE EPISODE OF RECURRENT MAJOR DEPRESSIVE DISORDER (HCC): Primary | ICD-10-CM

## 2021-02-10 PROCEDURE — 90833 PSYTX W PT W E/M 30 MIN: CPT | Performed by: NURSE PRACTITIONER

## 2021-02-10 PROCEDURE — 99214 OFFICE O/P EST MOD 30 MIN: CPT | Performed by: NURSE PRACTITIONER

## 2021-02-10 RX ORDER — FLUOXETINE HYDROCHLORIDE 20 MG/1
60 CAPSULE ORAL DAILY
Qty: 90 CAPSULE | Refills: 1 | Status: SHIPPED | OUTPATIENT
Start: 2021-02-10 | End: 2021-03-24 | Stop reason: SDUPTHER
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
## 2021-03-24 ENCOUNTER — VIRTUAL VISIT (OUTPATIENT)
Dept: PSYCHIATRY | Age: 36
End: 2021-03-24
Payer: COMMERCIAL

## 2021-03-24 DIAGNOSIS — F60.5 OBSESSIVE COMPULSIVE PERSONALITY DISORDER (HCC): ICD-10-CM

## 2021-03-24 DIAGNOSIS — F33.1 MODERATE EPISODE OF RECURRENT MAJOR DEPRESSIVE DISORDER (HCC): Primary | ICD-10-CM

## 2021-03-24 DIAGNOSIS — F41.1 GAD (GENERALIZED ANXIETY DISORDER): ICD-10-CM

## 2021-03-24 PROCEDURE — 90833 PSYTX W PT W E/M 30 MIN: CPT | Performed by: NURSE PRACTITIONER

## 2021-03-24 PROCEDURE — 99214 OFFICE O/P EST MOD 30 MIN: CPT | Performed by: NURSE PRACTITIONER

## 2021-03-24 RX ORDER — FLUOXETINE HYDROCHLORIDE 20 MG/1
60 CAPSULE ORAL DAILY
Qty: 90 CAPSULE | Refills: 1 | Status: SHIPPED | OUTPATIENT
Start: 2021-03-24 | End: 2021-05-19 | Stop reason: SDUPTHER

## 2021-03-24 RX ORDER — BUSPIRONE HYDROCHLORIDE 15 MG/1
15 TABLET ORAL 2 TIMES DAILY
Qty: 60 TABLET | Refills: 1 | Status: SHIPPED | OUTPATIENT
Start: 2021-03-24 | End: 2021-05-19 | Stop reason: DRUGHIGH
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
## 2021-03-29 ENCOUNTER — VIRTUAL VISIT (OUTPATIENT)
Dept: PSYCHIATRY | Age: 36
End: 2021-03-29
Payer: COMMERCIAL

## 2021-03-29 DIAGNOSIS — F60.5 OBSESSIVE COMPULSIVE PERSONALITY DISORDER (HCC): Primary | ICD-10-CM

## 2021-03-29 PROCEDURE — 90837 PSYTX W PT 60 MINUTES: CPT | Performed by: PSYCHOLOGIST

## 2021-04-16 ENCOUNTER — VIRTUAL VISIT (OUTPATIENT)
Dept: PSYCHIATRY | Age: 36
End: 2021-04-16
Payer: COMMERCIAL

## 2021-04-16 DIAGNOSIS — F50.89 OTHER SPECIFIED EATING DISORDER: ICD-10-CM

## 2021-04-16 DIAGNOSIS — F60.5 OBSESSIVE COMPULSIVE PERSONALITY DISORDER (HCC): Primary | ICD-10-CM

## 2021-04-16 PROCEDURE — 90837 PSYTX W PT 60 MINUTES: CPT | Performed by: PSYCHOLOGIST
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
## 2021-05-12 ENCOUNTER — VIRTUAL VISIT (OUTPATIENT)
Dept: PSYCHIATRY | Age: 36
End: 2021-05-12
Payer: COMMERCIAL

## 2021-05-12 DIAGNOSIS — F60.5 OBSESSIVE COMPULSIVE PERSONALITY DISORDER (HCC): Primary | ICD-10-CM

## 2021-05-12 DIAGNOSIS — F50.89 OTHER SPECIFIED EATING DISORDER: ICD-10-CM

## 2021-05-12 PROCEDURE — 90832 PSYTX W PT 30 MINUTES: CPT | Performed by: PSYCHOLOGIST

## 2021-05-19 ENCOUNTER — VIRTUAL VISIT (OUTPATIENT)
Dept: PSYCHIATRY | Age: 36
End: 2021-05-19
Payer: COMMERCIAL

## 2021-05-19 DIAGNOSIS — F33.0 MILD EPISODE OF RECURRENT MAJOR DEPRESSIVE DISORDER (HCC): Primary | ICD-10-CM

## 2021-05-19 DIAGNOSIS — F41.1 GAD (GENERALIZED ANXIETY DISORDER): ICD-10-CM

## 2021-05-19 DIAGNOSIS — F60.5 OBSESSIVE COMPULSIVE PERSONALITY DISORDER (HCC): ICD-10-CM

## 2021-05-19 PROCEDURE — 90833 PSYTX W PT W E/M 30 MIN: CPT | Performed by: NURSE PRACTITIONER

## 2021-05-19 PROCEDURE — 99214 OFFICE O/P EST MOD 30 MIN: CPT | Performed by: NURSE PRACTITIONER

## 2021-05-19 RX ORDER — FLUOXETINE HYDROCHLORIDE 20 MG/1
60 CAPSULE ORAL DAILY
Qty: 90 CAPSULE | Refills: 1 | Status: SHIPPED | OUTPATIENT
Start: 2021-05-19 | End: 2021-07-21 | Stop reason: SDUPTHER

## 2021-05-19 RX ORDER — BUSPIRONE HYDROCHLORIDE 15 MG/1
15 TABLET ORAL 3 TIMES DAILY
Qty: 90 TABLET | Refills: 1 | Status: SHIPPED | OUTPATIENT
Start: 2021-05-19 | End: 2021-07-21 | Stop reason: SDUPTHER

## 2021-05-19 RX ORDER — HYDROXYZINE PAMOATE 25 MG/1
25 CAPSULE ORAL 3 TIMES DAILY PRN
Qty: 90 CAPSULE | Refills: 1 | Status: SHIPPED | OUTPATIENT
Start: 2021-05-19 | End: 2021-07-21 | Stop reason: SDUPTHER

## 2021-05-19 NOTE — PROGRESS NOTES
[address] [place] [address] [address] [id number]  Dept: [phone]  Dept Fax: [phone]: [phone]    Visit Date: 5/19/2021     TELEPSYCHIATRY VISIT -- Audio/Visual (During PDTLK-47 public health emergency)    Kell Tyler is a 39 y.o. female being evaluated by a Virtual Visit (video visit) encounter to address concerns as mentioned  below. A caregiver was present when appropriate. Pursuant to the emergency declaration under the [address] [place] [address], [address] [place] [address] authority and the [employer] Act, this Virtual Visit was conducted with patient's (and/or legal guardian's) consent, to reduce the patient's risk of exposure to COVID-19 and provide necessary medical care. The patient (and/or legal guardian) has also been advised to contact this office for worsening conditions or problems, and seek emergency medical treatment and/or call 911 if deemed necessary. Services were provided through a video synchronous discussion virtually to substitute for in-person clinic visit. Patient and provider were located at their individual homes. SUBJECTIVE DATA     CHIEF COMPLAINT:    Chief Complaint   Patient presents with    Anxiety    Depression    Follow-up       History obtained from: patient    HISTORY OF PRESENT ILLNESS:    Kell Tyler is a 39 y.o. female who presents via tele-health audiovisual visit to follow-up on complaints of depression and anxiety. Her last visit was 03/24/2021 as a telehealth visit.     Some days good, some days not so good  -still with ups and downs  -ruminating thoughts are much better  -not dwelling as much  -states she still feel overwhelming feelings of having too much to do  -tries to avoid and hide from her feelings and responsibilities  -still isolates self at times  -reports the BuSpar is very helpful; it is helping her keep  Active Member of Clubs or Organizations:     Attends Club or Organization Meetings:     Marital Status:    Intimate Partner Violence:     Fear of Current or Ex-Partner:     Emotionally Abused:     Physically Abused:     Sexually Abused:        FAMILY HISTORY:   Family History   Problem Relation Age of Onset    Diabetes Mother     Hypertension Father     Heart Disease Father     Cancer Father         bladder    Mult Sclerosis Sister     Anxiety Disorder Paternal Grandfather     Bipolar Disorder Paternal Grandfather        Psychiatric Family History  As noted above    PAST MEDICAL HISTORY:    Past Medical History:   Diagnosis Date    Chronic back pain     Gestational diabetes     during 2nd pregnancy    Graves disease     Hypertension     Migraine     PCOS (polycystic ovarian syndrome)        PAST SURGICAL HISTORY:    Past Surgical History:   Procedure Laterality Date     SECTION      x2       PREVIOUSMEDICATIONS:  Outpatient Medications Prior to Visit   Medication Sig Dispense Refill    labetalol (NORMODYNE) 100 MG tablet Take 100 mg by mouth 2 times daily      FLUoxetine (PROZAC) 20 MG capsule Take 3 capsules by mouth daily 90 capsule 1    busPIRone (BUSPAR) 15 MG tablet Take 15 mg by mouth 2 times daily 60 tablet 1    hydrOXYzine (VISTARIL) 25 MG capsule Take 1 capsule by mouth 3 times daily as needed for Anxiety 90 capsule 1     No facility-administered medications prior to visit. ALLERGIES:    Lisinopril    REVIEW OF SYSTEMS:    Review of Systems    The patient sees No primary care provider on file. as her primary care provider. SPECIALISTS: Endocrinologist (Grave's Disease)    OBJECTIVE DATA     There were no vitals taken for this visit. Physical Exam    Mental Status Evaluation:   Orientation: Alert, oriented, thought content appropriate   Mood:.  Dysthymic      Affect:  Normal      Appearance:  Age Appropriate, Casually Dressed, Overweight, Clean, Well Groomed, Clothing Appropriate for Age and Clothing Appropriate for Weather   Activity:  Cooperative, Good Eye Contact and Seated Calmly   Gait/Posture: Normal   Speech:  Clear, Fluent, Normal Pitch and Volume, Age and Situation Appropriate   Thought Process: Within Normal Limits   Thought Content: Within Normal Limits   Cognition:  Grossly Intact   Memory: Intact   Insight:  Good   Judgment: Good   Suicidal Ideations: Denies Suicidal Ideation   Homicidal Ideations: Negative for homicidal ideation   Medication Side Effects: Absent       Attention Span Attention span and concentration were age appropriate       Screenings Completed in This Encounter:     Anxiety and Depression:                    DIAGNOSIS AND ASSESSMENT DATA     DIAGNOSIS:   1. Mild episode of recurrent major depressive disorder ([facility]    2. AYO (generalized anxiety disorder)    3. Obsessive compulsive personality disorder ([facility]      PLAN   Follow-up:  No follow-ups on file. Prescriptions for this encounter:  New Prescriptions    No medications on file       Orders Placed This Encounter   Medications    hydrOXYzine (VISTARIL) 25 MG capsule     Sig: Take 1 capsule by mouth 3 times daily as needed for Anxiety     Dispense:  90 capsule     Refill:  1    FLUoxetine (PROZAC) 20 MG capsule     Sig: Take 3 capsules by mouth daily     Dispense:  90 capsule     Refill:  1    busPIRone (BUSPAR) 15 MG tablet     Sig: Take 15 mg by mouth 3 times daily     Dispense:  90 tablet     Refill:  1       Medications Discontinued During This Encounter   Medication Reason    hydrOXYzine (VISTARIL) 25 MG capsule REORDER    FLUoxetine (PROZAC) 20 MG capsule REORDER    busPIRone (BUSPAR) 15 MG tablet DOSE ADJUSTMENT       Additional orders:  No orders of the defined types were placed in this encounter. Patient is reporting continued anxiety symptoms. Treatment options reviewed. She will increase to BuSpar 15mg three times daily. Supportive therapy provided.  She is encouraged to actively participate in individual psychotherapy. Patient is encouraged to utilize nonpharmacologic coping skills such as deep breathing, guided imagery, guided meditation, muscle relaxation, calming music, and/or journaling. Risks, potential side effects, possibledrug-drug interactions, benefits and alternate treatments discussed in detail. All questions answered. Patient stated understanding and is agreeable to treatment plan. Patient has been instructed to seek emergency help via the emergency and/or calling 911 should symptoms become severe, worsen, or with other concerning symptoms. Patient instructed to goimmediately to the emergency room and/or call 911 with any suicidal or homicidal ideations or if audio/visual hallucinations develop  Patient stated understanding and agrees. Patient given crisis center information. I spent a total of 20 minutes with the patient in counseling regarding topics discussed above. Utilized CBT, motivational interviewing, and reflective listening to address topics above. Patient responsive and engaged throughout session. The remainder session was spent on symptom evaluation and medication management. Provider Signature:  Electronically signed by EDITH Cm CNP on 5/19/2021 at 1:47 PM    **This report has been created using voice recognition software. It may contain minor errors which are inherent in voice recognition technology. **

## 2021-06-10 ENCOUNTER — VIRTUAL VISIT (OUTPATIENT)
Dept: PSYCHIATRY | Age: 36
End: 2021-06-10
Payer: COMMERCIAL

## 2021-06-10 DIAGNOSIS — F60.5 OBSESSIVE COMPULSIVE PERSONALITY DISORDER (HCC): Primary | ICD-10-CM

## 2021-06-10 DIAGNOSIS — F50.89 OTHER SPECIFIED EATING DISORDER: ICD-10-CM

## 2021-06-10 PROCEDURE — 90834 PSYTX W PT 45 MINUTES: CPT | Performed by: PSYCHOLOGIST
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
## 2021-06-24 ENCOUNTER — VIRTUAL VISIT (OUTPATIENT)
Dept: PSYCHIATRY | Age: 36
End: 2021-06-24
Payer: COMMERCIAL

## 2021-06-24 DIAGNOSIS — F50.89 OTHER SPECIFIED EATING DISORDER: ICD-10-CM

## 2021-06-24 DIAGNOSIS — F60.5 OBSESSIVE COMPULSIVE PERSONALITY DISORDER (HCC): Primary | ICD-10-CM

## 2021-06-24 PROCEDURE — 90832 PSYTX W PT 30 MINUTES: CPT | Performed by: PSYCHOLOGIST

## 2021-06-24 NOTE — PROGRESS NOTES
TELEPSYCHOLOGY VISIT -- Audio/Visual (During IIGDL-72 public health emergency)     This session was conducted as a telepsychology visit due to one or more of the following COVID-19 risk factors being present in this patient:  · The patient is aged 61 or older  · The patient reports chronic health problems  · The patient reports immune suppressed or immune compromised status  · The patient reports being at risk or potentially exposed to the virus     Patient Location: Home     Provider Location: [facility]      Patient gave verbal consent for teleservices.      This virtual visit was conducted via interactive/real-time [address] and eating behaviors     History of Presenting Illness:   Helen Landaverde a 37 year-old, female, referred for a psychiatric evaluation for binge eating by Mat Mcnulty CNP.  She is currently following with Valentin Pat CNP for medication management. She is following up with psychology to learn adaptive coping strategies to manage stress, mood, and binge eating. Assessment:  She reports she is \"doing okay. \" She reports she is struggling with avoiding things at work. She reports she is likely using social isolation as part of her reward system; however, this does not appear to help her mood and productivity long term.     MENTAL STATUS EXAM   General appearance: dressed appropriately, well-groomed  Attitude & Verneita Lo and cooperative  Motor Activity & Musculoskeletal: no abnormal movements  Speech & Language: normal rate, volume, and prosody  Gait & Station: sitting  Mood: somewhat anxious with work   Affect: congruent with mood, appropriate to setting  Thought content: appropriate  Suicidal ideation: denies  Homicidal ideation: denies  Thought process & Associations: logical, goal oriented, coherent  Perceptions: normal  Orientation: to person, place, and time  Attention & Concentration: intact  Fund of knowledge: average  Insight: adequate  Judgment: adequate     DSM DIAGNOSIS:  Obsessive compulsive personality disorder  Other specified eating disorder, binge eating occurs less than once per week       INTERVENTION:   Discussed the role of values driven versus goals style of living. Also, discussed the concept of a struggle switch. Discussed specific situation and walked through a more values approached perspective versus goals. Discussed the role of positive reinforcement and self-esteem. Also discussed the role of power/control. Discussed homework of ABC worksheets. SUMMARY/PLAN:   Patient continues to make progress during session today and appears to have adequate insight. Additional follow up session has been scheduled. Psychology Clinician:  Alaina Montes De Oca, PhD     Start time: 9:00 am  End time: 9:35 am      Pursuant to the emergency declaration under the [facility], [address] [place] [address] authority and the [employer] Act, this Virtual Visit was conducted, with patient's consent, to reduce the patient's risk of exposure to COVID-19 and provide continuity of care for an established patient. Services were provided through a video synchronous discussion virtually to substitute for in-person clinic visit.

## 2021-07-07 ENCOUNTER — VIRTUAL VISIT (OUTPATIENT)
Dept: PSYCHIATRY | Age: 36
End: 2021-07-07
Payer: COMMERCIAL

## 2021-07-07 DIAGNOSIS — F50.89 OTHER SPECIFIED EATING DISORDER: ICD-10-CM

## 2021-07-07 DIAGNOSIS — F60.5 OBSESSIVE COMPULSIVE PERSONALITY DISORDER (HCC): Primary | ICD-10-CM

## 2021-07-07 PROCEDURE — 90834 PSYTX W PT 45 MINUTES: CPT | Performed by: PSYCHOLOGIST

## 2021-07-07 NOTE — PROGRESS NOTES
TELEPSYCHOLOGY VISIT -- Audio/Visual (During XOBCQ-22 public health emergency)     This session was conducted as a telepsychology visit due to one or more of the following COVID-19 risk factors being present in this patient:  · The patient is aged 61 or older  · The patient reports chronic health problems  · The patient reports immune suppressed or immune compromised status  · The patient reports being at risk or potentially exposed to the virus     Patient Location: Home     Provider Location: [facility]      Patient gave verbal consent for teleservices.      This virtual visit was conducted via interactive/real-time [facility] and eating behaviors     History of Presenting Illness:   Domingo Alexis a 50-year-old, female, referred for a psychiatric evaluation for binge eating by Briana Webb CNP.  She is currently following with Chelsi Singh CNP for medication management. She is following up with psychology to learn adaptive coping strategies to manage stress, mood, and binge eating. Assessment:  She reports she was able to review positive/negative power chart. She reports she tends to engage in more negative ways of giving and taking power.      MENTAL STATUS EXAM   General appearance: dressed appropriately, well-groomed  Attitude & Behavior: pleasant and cooperative  Motor Activity & Musculoskeletal: no abnormal movements  Speech & Language: normal rate, volume, and prosody  Gait & Station: sitting  Mood: somewhat anxious with work   Affect: congruent with mood, appropriate to setting  Thought content: appropriate  Suicidal ideation: denies  Homicidal ideation: denies  Thought process & Associations: logical, goal oriented, coherent  Perceptions: normal  Orientation: to person, place, and time  Attention & Concentration: intact  Fund of knowledge: average  Insight: adequate  Judgment: adequate     DSM DIAGNOSIS:  Obsessive compulsive personality disorder  Other specified eating disorder, binge eating occurs less than once per week       INTERVENTION:   Reviewed power chart that was discussed during last session. She was able to review specific situations that are more problematic in regards to power/control. Discussed possible solutions for gaining power/control in a healthy way. Provided positive reinforcement for changes in communication she has made regarding ultimatums, and building insight in the moment in order to make changes. Homework: work on setting up boundaries and also respecting boundaries. Also, work on positive reinforcement strategies versus negative reinforcement with her children. SUMMARY/PLAN:   Patient continues to make progress during session today and appears to have adequate insight. Additional follow up session has been scheduled for 8/18/2021. Psychology Clinician:  Samuel Fong, PhD     Start time: 9:30 am  End time: 10:12 am      Pursuant to the emergency declaration under the [facility], [facility] waiver authority and the [employer] and [employer] Act, this Virtual Visit was conducted, with patient's consent, to reduce the patient's risk of exposure to COVID-19 and provide continuity of care for an established patient. Services were provided through a video synchronous discussion virtually to substitute for in-person clinic visit.

## 2021-07-21 ENCOUNTER — VIRTUAL VISIT (OUTPATIENT)
Dept: PSYCHIATRY | Age: 36
End: 2021-07-21
Payer: COMMERCIAL

## 2021-07-21 DIAGNOSIS — F41.1 GAD (GENERALIZED ANXIETY DISORDER): ICD-10-CM

## 2021-07-21 DIAGNOSIS — F33.0 MILD EPISODE OF RECURRENT MAJOR DEPRESSIVE DISORDER (HCC): Primary | ICD-10-CM

## 2021-07-21 DIAGNOSIS — F60.5 OBSESSIVE COMPULSIVE PERSONALITY DISORDER (HCC): ICD-10-CM

## 2021-07-21 PROCEDURE — 90833 PSYTX W PT W E/M 30 MIN: CPT | Performed by: NURSE PRACTITIONER

## 2021-07-21 PROCEDURE — 99214 OFFICE O/P EST MOD 30 MIN: CPT | Performed by: NURSE PRACTITIONER

## 2021-07-21 RX ORDER — HYDROXYZINE PAMOATE 25 MG/1
25 CAPSULE ORAL 3 TIMES DAILY PRN
Qty: 90 CAPSULE | Refills: 2 | Status: SHIPPED | OUTPATIENT
Start: 2021-07-21 | End: 2021-10-25

## 2021-07-21 RX ORDER — BUSPIRONE HYDROCHLORIDE 15 MG/1
15 TABLET ORAL 3 TIMES DAILY
Qty: 90 TABLET | Refills: 2 | Status: SHIPPED | OUTPATIENT
Start: 2021-07-21 | End: 2021-10-25

## 2021-07-21 RX ORDER — FLUOXETINE HYDROCHLORIDE 20 MG/1
60 CAPSULE ORAL DAILY
Qty: 90 CAPSULE | Refills: 2 | Status: SHIPPED | OUTPATIENT
Start: 2021-07-21 | End: 2021-10-25
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
## 2021-08-18 ENCOUNTER — VIRTUAL VISIT (OUTPATIENT)
Dept: PSYCHIATRY | Age: 36
End: 2021-08-18
Payer: COMMERCIAL

## 2021-08-18 DIAGNOSIS — F50.89 OTHER SPECIFIED EATING DISORDER: ICD-10-CM

## 2021-08-18 DIAGNOSIS — F60.5 OBSESSIVE COMPULSIVE PERSONALITY DISORDER (HCC): Primary | ICD-10-CM

## 2021-08-18 PROCEDURE — 90832 PSYTX W PT 30 MINUTES: CPT | Performed by: PSYCHOLOGIST

## 2021-08-18 NOTE — PROGRESS NOTES
TELEPSYCHOLOGY VISIT -- Audio/Visual (During BNKUZ-68 public health emergency)     This session was conducted as a telepsychology visit due to one or more of the following COVID-19 risk factors being present in this patient:  · The patient is aged 61 or older  · The patient reports chronic health problems  · The patient reports immune suppressed or immune compromised status  · The patient reports being at risk or potentially exposed to the virus     Patient Location: Home     Provider Location: [facility]      Patient gave verbal consent for teleservices.      This virtual visit was conducted via interactive/real-time [facility] [employer] [address] and eating behaviors     History of Presenting Illness:   Eleanor Conway a 79-year-old, female, referred for a psychiatric evaluation for binge eating by Sarabjit Sena CNP.  She is currently following with Barby Belle CNP for medication management. She is following up with psychology to learn adaptive coping strategies to manage stress, mood, and binge eating. Assessment:  She reports she has been doing well. She reports she recently came back from family vacation and reports it was an awesome time (much to her surprise).      MENTAL STATUS EXAM   General appearance: dressed appropriately, well-groomed  Attitude & Behavior: pleasant and cooperative  Motor Activity & Musculoskeletal: no abnormal movements  Speech & Language: normal rate, volume, and prosody  Gait & Station: sitting  Mood: somewhat anxious with work   Affect: congruent with mood, appropriate to setting  Thought content: appropriate  Suicidal ideation: denies  Homicidal ideation: denies  Thought process & Associations: logical, goal oriented, coherent  Perceptions: normal  Orientation: to person, place, and time  Attention & Concentration: intact  Fund of knowledge: average  Insight: adequate  Judgment: adequate     DSM DIAGNOSIS:  Obsessive compulsive personality disorder  Other specified eating disorder, binge eating occurs less than once per week       INTERVENTION:   Provided positive reinforcement for patient being more flexible/less rigid. Also provided positive reinforcement for patient giving her children positive reinforcement versus positive punishment. Discussed barriers in the workplace. She reports an increase in IBS symptoms that were stress induced. Discussed the role of respect within the workplace. Provided positive reinforcement for her demonstrating acceptance for herself in physical and external characteristics. SUMMARY/PLAN:   Patient continues to make progress during session today and appears to have adequate insight. Additional follow up session has been scheduled for 9/10/2021. Psychology Clinician:  Lillian Easley, PhD     Start time: 10:00 am  End time: 10:28 am      Pursuant to the emergency declaration under the [facility] waiver authority and the [employer] Act, this Virtual Visit was conducted, with patient's consent, to reduce the patient's risk of exposure to COVID-19 and provide continuity of care for an established patient. Services were provided through a video synchronous discussion virtually to substitute for in-person clinic visit.

## 2021-09-10 ENCOUNTER — VIRTUAL VISIT (OUTPATIENT)
Dept: PSYCHIATRY | Age: 36
End: 2021-09-10
Payer: COMMERCIAL

## 2021-09-10 DIAGNOSIS — F60.5 OBSESSIVE COMPULSIVE PERSONALITY DISORDER (HCC): Primary | ICD-10-CM

## 2021-09-10 DIAGNOSIS — F50.89 OTHER SPECIFIED EATING DISORDER: ICD-10-CM

## 2021-09-10 PROCEDURE — 90832 PSYTX W PT 30 MINUTES: CPT | Performed by: PSYCHOLOGIST

## 2021-09-10 NOTE — PROGRESS NOTES
TELEPSYCHOLOGY VISIT -- Audio/Visual (During BQQSS-35 public health emergency)     This session was conducted as a telepsychology visit due to one or more of the following COVID-19 risk factors being present in this patient:  · The patient is aged 61 or older  · The patient reports chronic health problems  · The patient reports immune suppressed or immune compromised status  · The patient reports being at risk or potentially exposed to the virus     Patient Location: Home     Provider Location: [facility]      Patient gave verbal consent for teleservices.      This virtual visit was conducted via interactive/real-time [address] and eating behaviors     History of Presenting Illness:   Jason Shukla a 35 year-old, female, referred for a psychiatric evaluation for binge eating by Shauna Carmona CNP.  She is currently following with Ashley Schuster CNP for medication management. She is following up with psychology to learn adaptive coping strategies to manage stress, mood, and binge eating. Assessment:  Reports feelings of isolation, stress related to COVID vaccine mandate, difficulties focusing, and resurgence of multiple symptoms of mood such as low motivation, and GI concerns. Reports the more she allows herself to be vulnerable and be empathetic, the harder life is. She reports binge eating behaviors have increased over the past 3-4 weeks. Reports she is having difficulties engaging in self-care behaviors (showering, bathing, etc). She reports these behaviors increased as a result of landscaping debacle.        MENTAL STATUS EXAM   General appearance: dressed appropriately, well-groomed  Attitude & Behavior: pleasant and cooperative  Motor Activity & Musculoskeletal: no abnormal movements  Speech & Language: normal rate, volume, and prosody  Gait & Station: sitting  Mood: somewhat anxious with work   Affect: congruent with mood, appropriate to setting  Thought content: appropriate  Suicidal ideation: denies  Homicidal ideation: denies  Thought process & Associations: logical, goal oriented, coherent  Perceptions: normal  Orientation: to person, place, and time  Attention & Concentration: intact  Fund of knowledge: average  Insight: adequate  Judgment: adequate     DSM DIAGNOSIS:  Obsessive compulsive personality disorder  Major depressive disorder, single episode, currently active   Other specified eating disorder, binge eating occurs less than once per week       INTERVENTION:   Discussed aspects of control within her personal and work life. Start going back to journaling - what did I do well today? Provided metaphor for depression and perspectives. SUMMARY/PLAN:   Patient continues to make progress during session today and appears to have adequate insight. She plans to follow up with writer in approximately 1 month. Psychology Clinician:  Terrance Daniels, PhD     Start time: 12:30 pm  End time: 1:00 pm      Pursuant to the emergency declaration under the [facility] waiver authority and the [employer] Act, this Virtual Visit was conducted, with patient's consent, to reduce the patient's risk of exposure to COVID-19 and provide continuity of care for an established patient. Services were provided through a video synchronous discussion virtually to substitute for in-person clinic visit.

## 2021-10-25 NOTE — TELEPHONE ENCOUNTER
[facility] is requesting several medication refills on Kassie's behalf for Buspar 15mg;#90 with 2 refills, Prozac 20mg;#90 with 2 refills and Vistaril 25mg;#90 with 2 refills; all were last sent on 07/21/21. Records indicate that the pharmacy marked all medications last filled for 90 day supplies on 07/30/21. Medications are pending your approval for a 90 day supply with 0 refills as the pharmacy as requested. She is scheduled to return on 11/02/21 and with Dr. Luther Pryor on 10/27/21. She was last seen on 07/21/21. Please advise otherwise.

## 2021-10-27 ENCOUNTER — VIRTUAL VISIT (OUTPATIENT)
Dept: PSYCHIATRY | Age: 36
End: 2021-10-27
Payer: COMMERCIAL

## 2021-10-27 DIAGNOSIS — F50.89 OTHER SPECIFIED EATING DISORDER: ICD-10-CM

## 2021-10-27 DIAGNOSIS — F60.5 OBSESSIVE COMPULSIVE PERSONALITY DISORDER (HCC): Primary | ICD-10-CM

## 2021-10-27 DIAGNOSIS — F32.0 CURRENT MILD EPISODE OF MAJOR DEPRESSIVE DISORDER WITHOUT PRIOR EPISODE (HCC): ICD-10-CM

## 2021-10-27 PROCEDURE — 90832 PSYTX W PT 30 MINUTES: CPT | Performed by: PSYCHOLOGIST

## 2021-10-27 NOTE — PROGRESS NOTES
TELEPSYCHOLOGY VISIT -- Audio/Visual (During EXPQV-59 public health emergency)     This session was conducted as a telepsychology visit due to one or more of the following COVID-19 risk factors being present in this patient:  · The patient is aged 61 or older  · The patient reports chronic health problems  · The patient reports immune suppressed or immune compromised status  · The patient reports being at risk or potentially exposed to the virus     Patient Location: Home     Provider Location: [facility]      Patient gave verbal consent for teleservices.      This virtual visit was conducted via interactive/real-time [facility] [employer] [address] and eating behaviors     History of Presenting Illness:   Garrett Avilez a 35 year-old, female, referred for a psychiatric evaluation for binge eating by Sherry Vincent CNP.  She is currently following with Michelle Thomas CNP for medication management. She is following up with psychology to learn adaptive coping strategies to manage stress, mood, and binge eating. Assessment:  She reports she recently went on a camping trip with a long time friend and she enjoyed this time. She reports it has been slightly overwhelming since returning home to family and work. She reports she did have a \"riff\" with her parents when trying to be assertive with them. She reports work is improving and she has been engaging more at work versus avoiding certain aspects of her work. She reports having \"herky, jerky\" movements and is concerned that this is medication related. She reports all of a sudden her body will shake/random quick shiver. She reports it is always quick, never painful, and has been occurring. She reports that this occurs when she is sitting down, and even when she is eating. She reports she has noticed this occurring since the increase in buspirone.      MENTAL STATUS EXAM   General appearance: dressed appropriately, well-groomed, nose ring  Attitude & Behavior: pleasant and cooperative  Motor Activity & Musculoskeletal: no abnormal movements  Speech & Language: normal rate, volume, and prosody  Gait & Station: sitting  Mood: somewhat anxious, frustrated  Affect: congruent with mood, appropriate to setting  Thought content: appropriate  Suicidal ideation: denies  Homicidal ideation: denies  Thought process & Associations: logical, goal oriented, coherent  Perceptions: normal  Orientation: to person, place, and time  Attention & Concentration: intact  Fund of knowledge: average  Insight: adequate  Judgment: adequate     DSM DIAGNOSIS:  Obsessive compulsive personality disorder  Major depressive disorder, single episode, currently active   Other specified eating disorder, binge eating occurs less than once per week       INTERVENTION:   Provided positive reinforcement for patient being appropriately assertive with her parents and then letting the issue go. Discussed recent realizations regarding her parents and motivations for behaviors. Discussed how control hinders some of her interactions with coworkers and family. Discussed the importance of pacing herself at work. SUMMARY/PLAN:   Patient continues to make progress during session today and appears to have adequate insight. S Office staff will contact patient to schedule a follow up visit. Writer did speak with Alec Gottron, CNP in regards to patient's \"jerky\" movements. Patient is scheduled with Treva Bernabe next week at which time these concerns will be further addressed.      Psychology Clinician:  Maurice Paredes, PhD     Start time: 10:00am  End time: 10:35 am      Pursuant to the emergency declaration under the [facility], [facility] waiver authority and the [employer] Act, this Virtual Visit was conducted, with patient's consent, to reduce the patient's risk of exposure to COVID-19 and provide continuity of care for an established patient. Services were provided through a video synchronous discussion virtually to substitute for in-person clinic visit.

## 2021-10-28 RX ORDER — FLUOXETINE HYDROCHLORIDE 20 MG/1
CAPSULE ORAL
Qty: 270 CAPSULE | Refills: 0 | Status: SHIPPED | OUTPATIENT
Start: 2021-10-28 | End: 2022-01-26 | Stop reason: SDUPTHER

## 2021-10-28 RX ORDER — HYDROXYZINE PAMOATE 25 MG/1
25 CAPSULE ORAL 3 TIMES DAILY PRN
Qty: 270 CAPSULE | Refills: 0 | Status: SHIPPED | OUTPATIENT
Start: 2021-10-28 | End: 2022-01-26 | Stop reason: SDUPTHER

## 2021-10-28 RX ORDER — BUSPIRONE HYDROCHLORIDE 15 MG/1
TABLET ORAL
Qty: 270 TABLET | Refills: 0 | Status: SHIPPED | OUTPATIENT
Start: 2021-10-28 | End: 2022-01-26 | Stop reason: SDUPTHER

## 2021-11-02 ENCOUNTER — VIRTUAL VISIT (OUTPATIENT)
Dept: PSYCHIATRY | Age: 36
End: 2021-11-02
Payer: COMMERCIAL

## 2021-11-02 DIAGNOSIS — F33.0 MILD EPISODE OF RECURRENT MAJOR DEPRESSIVE DISORDER (HCC): Primary | ICD-10-CM

## 2021-11-02 DIAGNOSIS — F60.5 OBSESSIVE COMPULSIVE PERSONALITY DISORDER (HCC): ICD-10-CM

## 2021-11-02 DIAGNOSIS — F41.1 GAD (GENERALIZED ANXIETY DISORDER): ICD-10-CM

## 2021-11-02 PROCEDURE — 99213 OFFICE O/P EST LOW 20 MIN: CPT | Performed by: NURSE PRACTITIONER
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
## 2021-11-29 ENCOUNTER — VIRTUAL VISIT (OUTPATIENT)
Dept: PSYCHIATRY | Age: 36
End: 2021-11-29
Payer: COMMERCIAL

## 2021-11-29 DIAGNOSIS — F50.89 OTHER SPECIFIED EATING DISORDER: ICD-10-CM

## 2021-11-29 DIAGNOSIS — F60.5 OBSESSIVE COMPULSIVE PERSONALITY DISORDER (HCC): Primary | ICD-10-CM

## 2021-11-29 PROCEDURE — 90832 PSYTX W PT 30 MINUTES: CPT | Performed by: PSYCHOLOGIST

## 2021-11-29 NOTE — PROGRESS NOTES
TELEPSYCHOLOGY VISIT -- Audio/Visual (During EMXQJ-72 public health emergency)     This session was conducted as a telepsychology visit due to one or more of the following COVID-19 risk factors being present in this patient:  · The patient is aged 61 or older  · The patient reports chronic health problems  · The patient reports immune suppressed or immune compromised status  · The patient reports being at risk or potentially exposed to the virus     Patient Location: Home     Provider Location: [facility]      Patient gave verbal consent for teleservices.      This virtual visit was conducted via interactive/real-time [facility] [employer] and eating behaviors     History of Presenting Illness:   Valeri Conway a 26-year-old, female, referred for a psychiatric evaluation for binge eating by Anibal Gould CNP.  She is currently following with Yecenia Putnam CNP for medication management. She is following up with psychology to learn adaptive coping strategies to manage stress, mood, and binge eating. Assessment:  She reports feeling tired of talking/interacting with others (physically and mentally exhausted). She reports she has been feeling down the past 2-3 weeks, and she is unsure why. She reports current symptoms of depression such as low mood, anhedonia, and apathy. She also reports just wanting to be alone and notes that she has had stressors at home and at work, which in writer's opinion, likely triggered symptoms of depression.        MENTAL STATUS EXAM   General appearance: dressed appropriately, well-groomed, nose ring  Attitude & Behavior: pleasant and cooperative  Motor Activity & Musculoskeletal: no abnormal movements  Speech & Language: normal rate, volume, and prosody  Gait & Station: sitting  Mood: somewhat anxious, frustrated  Affect: congruent with mood, appropriate to setting  Thought content: appropriate  Suicidal ideation: denies  Homicidal ideation: denies  Thought process & Associations: logical, goal oriented, coherent  Perceptions: normal  Orientation: to person, place, and time  Attention & Concentration: intact  Fund of knowledge: average  Insight: adequate  Judgment: adequate     DSM DIAGNOSIS:  Obsessive compulsive personality disorder  Major depressive disorder, recurrent  Other specified eating disorder, binge eating occurs less than once per week       R/O social anxiety     INTERVENTION:   Discussed continuing to work on asking  for breaks when overwhelmed. Discussed domains of self-esteem, and feeling insecure in social situations. It is unclear how much of this is anxiety driven related to feeling insecure/low self-esteem. Discussed the high need for feeling wanted and noted similarities between her and her omther. Homework: complete character strengths quiz that writer will send via email. Complete values clarification. SUMMARY/PLAN:   Patient has additional follow up scheduled with writer. Psychology Clinician:  Quirino Lara, PhD     Start time: 01:00pm  End time: 01:35 pm      Pursuant to the emergency declaration under the [address] [place] [address], [facility] waiver authority and the [employer] Act, this Virtual Visit was conducted, with patient's consent, to reduce the patient's risk of exposure to COVID-19 and provide continuity of care for an established patient. Services were provided through a video synchronous discussion virtually to substitute for in-person clinic visit.

## 2021-12-01 ENCOUNTER — VIRTUAL VISIT (OUTPATIENT)
Dept: PSYCHIATRY | Age: 36
End: 2021-12-01
Payer: COMMERCIAL

## 2021-12-01 DIAGNOSIS — F60.5 OBSESSIVE COMPULSIVE PERSONALITY DISORDER (HCC): Primary | ICD-10-CM

## 2021-12-01 DIAGNOSIS — F50.89 OTHER SPECIFIED EATING DISORDER: ICD-10-CM

## 2021-12-01 DIAGNOSIS — F33.1 MODERATE EPISODE OF RECURRENT MAJOR DEPRESSIVE DISORDER (HCC): ICD-10-CM

## 2021-12-01 PROCEDURE — 90832 PSYTX W PT 30 MINUTES: CPT | Performed by: PSYCHOLOGIST

## 2021-12-01 NOTE — PROGRESS NOTES
TELEPSYCHOLOGY VISIT -- Audio/Visual (During ZAHCX-03 public health emergency)     This session was conducted as a telepsychology visit due to one or more of the following COVID-19 risk factors being present in this patient:  · The patient is aged 61 or older  · The patient reports chronic health problems  · The patient reports immune suppressed or immune compromised status  · The patient reports being at risk or potentially exposed to the virus     Patient Location: Home     Provider Location: [facility]      Patient gave verbal consent for teleservices.      This virtual visit was conducted via interactive/real-time [facility] [employer] [address] and eating behaviors     History of Presenting Illness:   Janis Stands a 14-year-old, female, referred for a psychiatric evaluation for binge eating by Kemi Mathur CNP.  She is currently following with Fallon Mtz CNP for medication management. She is following up with psychology to learn adaptive coping strategies to manage stress, mood, and binge eating. Assessment:  She reports feeling more in control of her emotions since our last session on Monday, 11/29/2021. She reports having a productive day on Tuesday that she feels accomplished with. She did complete character strength assessment and this was discussed during today's session.      MENTAL STATUS EXAM   General appearance: dressed appropriately, well-groomed, nose ring  Attitude & Behavior: pleasant and cooperative  Motor Activity & Musculoskeletal: no abnormal movements  Speech & Language: normal rate, volume, and prosody  Gait & Station: sitting  Mood: somewhat anxious, frustrated  Affect: congruent with mood, appropriate to setting  Thought content: appropriate  Suicidal ideation: denies  Homicidal ideation: denies  Thought process & Associations: logical, goal oriented, coherent  Perceptions: normal  Orientation: to person, place, and time  Attention & Concentration: intact  Fund of knowledge: average  Insight: adequate  Judgment: adequate     DSM DIAGNOSIS:  Obsessive compulsive personality disorder  Major depressive disorder, recurrent  Other specified eating disorder, binge eating occurs less than once per week       R/O social anxiety     INTERVENTION:   Discussed values clarification worksheet and character strengths/weaknesses. Specifically discussed teamwork at work and with family. Discussed a \"we\" mentality instead of \"I. \"     Homework: try communicating with family and coworkers from a \"we\" standpoint versus \"I. \"    SUMMARY/PLAN:   Patient has additional follow up scheduled with writer. Psychology Clinician:  Alyssa Aceves, PhD     Start time: 01:30pm  End time: 02:00 pm      Pursuant to the emergency declaration under the [facility], [facility] waiver authority and the [employer] Act, this Virtual Visit was conducted, with patient's consent, to reduce the patient's risk of exposure to COVID-19 and provide continuity of care for an established patient. Services were provided through a video synchronous discussion virtually to substitute for in-person clinic visit.

## 2022-01-26 ENCOUNTER — VIRTUAL VISIT (OUTPATIENT)
Dept: PSYCHIATRY | Age: 37
End: 2022-01-26
Payer: COMMERCIAL

## 2022-01-26 DIAGNOSIS — F60.5 OBSESSIVE COMPULSIVE PERSONALITY DISORDER (HCC): ICD-10-CM

## 2022-01-26 DIAGNOSIS — F41.1 GAD (GENERALIZED ANXIETY DISORDER): ICD-10-CM

## 2022-01-26 DIAGNOSIS — F33.0 MILD EPISODE OF RECURRENT MAJOR DEPRESSIVE DISORDER (HCC): Primary | ICD-10-CM

## 2022-01-26 PROCEDURE — 90833 PSYTX W PT W E/M 30 MIN: CPT | Performed by: NURSE PRACTITIONER

## 2022-01-26 PROCEDURE — 99214 OFFICE O/P EST MOD 30 MIN: CPT | Performed by: NURSE PRACTITIONER

## 2022-01-26 RX ORDER — HYDROXYZINE PAMOATE 25 MG/1
25 CAPSULE ORAL 3 TIMES DAILY PRN
Qty: 270 CAPSULE | Refills: 0 | Status: SHIPPED | OUTPATIENT
Start: 2022-01-26 | End: 2022-03-09 | Stop reason: DRUGHIGH

## 2022-01-26 RX ORDER — BUSPIRONE HYDROCHLORIDE 15 MG/1
TABLET ORAL
Qty: 270 TABLET | Refills: 0 | Status: SHIPPED | OUTPATIENT
Start: 2022-01-26 | End: 2022-03-09 | Stop reason: DRUGHIGH

## 2022-01-26 RX ORDER — FLUOXETINE HYDROCHLORIDE 20 MG/1
CAPSULE ORAL
Qty: 270 CAPSULE | Refills: 0 | Status: SHIPPED | OUTPATIENT
Start: 2022-01-26 | End: 2022-03-09 | Stop reason: DRUGHIGH
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
## 2022-02-18 ENCOUNTER — TELEMEDICINE (OUTPATIENT)
Dept: PSYCHOLOGY | Age: 37
End: 2022-02-18
Payer: COMMERCIAL

## 2022-02-18 DIAGNOSIS — F60.5 OBSESSIVE COMPULSIVE PERSONALITY DISORDER (HCC): Primary | ICD-10-CM

## 2022-02-18 DIAGNOSIS — F50.89 OTHER SPECIFIED EATING DISORDER: ICD-10-CM

## 2022-02-18 DIAGNOSIS — F33.1 MODERATE EPISODE OF RECURRENT MAJOR DEPRESSIVE DISORDER (HCC): ICD-10-CM

## 2022-02-18 PROCEDURE — 90837 PSYTX W PT 60 MINUTES: CPT | Performed by: PSYCHOLOGIST

## 2022-02-18 NOTE — PROGRESS NOTES
Niki Pacheco, was evaluated through a synchronous (real-time) audio-video encounter. The patient (or guardian if applicable) is aware that this is a billable service, which includes applicable co-pays. This Virtual Visit was conducted with patient's (and/or legal guardian's) consent. The visit was conducted pursuant to the emergency declaration under the [facility], [address] [place] [address] authority and the [employer] General Act. Patient identification was verified, and a caregiver was present when appropriate. The patient was located in a state where the provider was licensed to provide care.          [facility]- [facility] [address] and [facility] behaviors     History of Presenting Illness:   Aurora Senters a 79-year-old, female, referred for a psychiatric evaluation for binge eating by Kerwin Fabry, CNP.  She is currently following with Alejandra Castillo CNP for medication management. She is following up with psychology to learn adaptive coping strategies to manage stress, mood, and binge eating. Assessment:  She reports the last 2 months have been difficult, since [place] time. She notes that the trigger for her increase in symptoms of mood was related to beliefs about her parents. Mood: low motivation, low energy, anhedonia, difficulties concentrating, and difficulties making decisions. She reports she is wanting to hide and isolate. She reports always having running narrative of thoughts that she previously perceived as ruminating thoughts, but now thinks this could be ADD. She reports wanting to escape, but denies suicidal thoughts, plan, and intent. She reports feeling that she doesn't know who she is. She reports having a feeling of \"emptiness. \" She reports also coming to realization that she does not like her parents. Additionally, she reports there is a known male voice in her head that is constantly interfering with her thoughts. She notes the known male voice has been present for greater than 2 months; however, she is just now disclosing this information as she reports being embarrassed. Psych meds: she reports that hydroxyzine hasn't been helpful over the past 2 weeks due to increases in anxiety. She reports wondering if she used control as a way to cope with ADHD symptoms. She perceives that she has ADD. Of note, her symptoms appear to be related to history of trauma and anxiety versus ADD. She also reports wanting to make significant life changes related to marriage, and work and Lavaughn Crigler was encouraged not to make emotionally charged decisions at this time. MENTAL STATUS EXAM   General appearance: dressed appropriately, well-groomed, nose ring  Attitude & Behavior: pleasant and cooperative  Motor Activity & Musculoskeletal: no abnormal movements  Speech & Language: hyperverbal  Gait & Station: sitting  Mood: labile  Affect: congruent with mood  Thought content: appropriate  Suicidal ideation: denies  Homicidal ideation: denies  Thought process & Associations: illogical  Perceptions: normal  Orientation: to person, place, and time  Attention & Concentration: intact  Fund of knowledge: average  Insight: adequate  Judgment: adequate     DSM DIAGNOSIS:  Obsessive compulsive personality disorder  Major depressive disorder, recurrent, moderate  Other specified eating disorder, binge eating occurs less than once per week       R/O social anxiety; manic episode    INTERVENTION:   Discussed feelings of not knowing who she is. Discussed good and bad traits of \"good\" people. This appears to be a self-esteem issue and measures herself based on her audience and other external factors. Discussed and reviewed power/control concepts. There is also an apparent high need to have positive perceptions from others. Trust will need to be further explored, specifically, trusting herself to make decisions.     Discussed internal voice of past friend as a way for comfort, and escape/daydreaming. Identified stuck point of the hein rule, [I] don't deserve good, and being assertive is \"bad. \"     Also identified problematic thinking patterns of minimization, black and white thinking. Briefly discussed concepts of ACT and unhooking from her emotions. Homework: review various handouts related to ACT videos, and problematic thinking patterns. SUMMARY/PLAN:   Patient was tangential and disorganized during session today. She is disclosing new symptoms that have apparently been present longer than 2 months. It seems that patient is experiencing an increase in symptoms of anxiety related to a history of trauma (triggered by returning to her parents for [place]), and the realization that many of her perceptions throughout her life have likely been skewed due to maladaptive thinking patterns, and high need for perfectionism that originated as a child. Patient has multiple follow up visits scheduled with writer and will be following up with psychiatric medication provider, JACKIE Michaels in the beginning of March.      Psychology Clinician:  Lauren Hall, PhD     Start time: 11:00am  End time: 11:57am

## 2022-02-28 RX ORDER — BUSPIRONE HYDROCHLORIDE 15 MG/1
TABLET ORAL
Qty: 270 TABLET | Refills: 0 | OUTPATIENT
Start: 2022-02-28

## 2022-03-09 ENCOUNTER — TELEMEDICINE (OUTPATIENT)
Dept: PSYCHIATRY | Age: 37
End: 2022-03-09
Payer: COMMERCIAL

## 2022-03-09 DIAGNOSIS — F41.1 GAD (GENERALIZED ANXIETY DISORDER): ICD-10-CM

## 2022-03-09 DIAGNOSIS — F60.5 OBSESSIVE COMPULSIVE PERSONALITY DISORDER (HCC): ICD-10-CM

## 2022-03-09 DIAGNOSIS — F33.0 MILD EPISODE OF RECURRENT MAJOR DEPRESSIVE DISORDER (HCC): Primary | ICD-10-CM

## 2022-03-09 PROCEDURE — 90833 PSYTX W PT W E/M 30 MIN: CPT | Performed by: NURSE PRACTITIONER

## 2022-03-09 PROCEDURE — 99214 OFFICE O/P EST MOD 30 MIN: CPT | Performed by: NURSE PRACTITIONER

## 2022-03-09 RX ORDER — FLUOXETINE HYDROCHLORIDE 40 MG/1
80 CAPSULE ORAL DAILY
Qty: 60 CAPSULE | Refills: 1 | Status: SHIPPED | OUTPATIENT
Start: 2022-03-09 | End: 2022-04-19 | Stop reason: SDUPTHER

## 2022-03-09 RX ORDER — BUSPIRONE HYDROCHLORIDE 30 MG/1
30 TABLET ORAL 2 TIMES DAILY
Qty: 60 TABLET | Refills: 1 | Status: SHIPPED | OUTPATIENT
Start: 2022-03-09 | End: 2022-04-19 | Stop reason: SDUPTHER

## 2022-03-09 RX ORDER — HYDROXYZINE PAMOATE 25 MG/1
25-50 CAPSULE ORAL 3 TIMES DAILY PRN
Qty: 270 CAPSULE | Refills: 1 | Status: SHIPPED | OUTPATIENT
Start: 2022-03-09 | End: 2022-04-19 | Stop reason: SDUPTHER
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
## 2022-03-30 ENCOUNTER — TELEMEDICINE (OUTPATIENT)
Dept: PSYCHOLOGY | Age: 37
End: 2022-03-30
Payer: COMMERCIAL

## 2022-03-30 DIAGNOSIS — F50.89 OTHER SPECIFIED EATING DISORDER: ICD-10-CM

## 2022-03-30 DIAGNOSIS — F33.1 MODERATE EPISODE OF RECURRENT MAJOR DEPRESSIVE DISORDER (HCC): ICD-10-CM

## 2022-03-30 DIAGNOSIS — F60.5 OBSESSIVE COMPULSIVE PERSONALITY DISORDER (HCC): Primary | ICD-10-CM

## 2022-03-30 PROCEDURE — 90837 PSYTX W PT 60 MINUTES: CPT | Performed by: PSYCHOLOGIST

## 2022-03-30 NOTE — PROGRESS NOTES
Soco Odonnell, was evaluated through a synchronous (real-time) audio-video encounter. The patient (or guardian if applicable) is aware that this is a billable service, which includes applicable co-pays. This Virtual Visit was conducted with patient's (and/or legal guardian's) consent. The visit was conducted pursuant to the emergency declaration under the [facility], [address] [place] [address] authority and the [employer] Act. Patient identification was verified, and a caregiver was present when appropriate. The patient was located in a state where the provider was licensed to provide care.          PSYCHOLOGICAL FOLLOW- [facility] [address] and eating behaviors     History of Presenting Illness:   Solomon Lopez a 35 year-old, female, referred for a psychiatric evaluation for binge eating by Ann Marie Park CNP.  She is currently following with Simon Tejada CNP for medication management. She is following up with psychology to learn adaptive coping strategies to manage stress, mood, and binge eating. Assessment:  She reports psych meds were recently increased and notes last week she felt \"pretty even. \"     She also reports learning that she might be a [place] sensitive\" person. She identifies as being overwhelmed with environmental subtleties, and being overstimulated. She reports constantly being \"switched on. \"     She notes difficulties concentrating while at work which makes her want to run and cognitively/physically escape. She reports if she is feeling sleepy she will take a nap, and slept almost 2 entire days. She notes concerns related to drawing parallels between herself and her parents. Notes marital concerns related to daily functioning. She reports asking her  for help versus just expecting him to do chores. Of note, this appears to be related to her [employer]. Notes a significant decrease in intrusive thoughts.         MENTAL

## 2022-04-12 RX ORDER — FLUOXETINE HYDROCHLORIDE 40 MG/1
CAPSULE ORAL
Qty: 60 CAPSULE | Refills: 1 | OUTPATIENT
Start: 2022-04-12

## 2022-04-12 RX ORDER — BUSPIRONE HYDROCHLORIDE 30 MG/1
TABLET ORAL
Qty: 60 TABLET | Refills: 1 | OUTPATIENT
Start: 2022-04-12

## 2022-04-13 ENCOUNTER — TELEMEDICINE (OUTPATIENT)
Dept: PSYCHOLOGY | Age: 37
End: 2022-04-13
Payer: COMMERCIAL

## 2022-04-13 DIAGNOSIS — F50.89 OTHER SPECIFIED EATING DISORDER: ICD-10-CM

## 2022-04-13 DIAGNOSIS — F60.5 OBSESSIVE COMPULSIVE PERSONALITY DISORDER (HCC): Primary | ICD-10-CM

## 2022-04-13 DIAGNOSIS — F33.1 MODERATE EPISODE OF RECURRENT MAJOR DEPRESSIVE DISORDER (HCC): ICD-10-CM

## 2022-04-13 PROCEDURE — 90834 PSYTX W PT 45 MINUTES: CPT | Performed by: PSYCHOLOGIST

## 2022-04-13 NOTE — PROGRESS NOTES
Teofilo Angulo, was evaluated through a synchronous (real-time) audio-video encounter. The patient (or guardian if applicable) is aware that this is a billable service, which includes applicable co-pays. This Virtual Visit was conducted with patient's (and/or legal guardian's) consent. The visit was conducted pursuant to the emergency declaration under the [facility], [address] [place] [address] and the [employer] Act. Patient identification was verified, and a caregiver was present when appropriate. The patient was located in a state where the provider was licensed to provide care.          PSYCHOLOGICAL FOLLOW- [address] and eating behaviors     History of Presenting Illness:   Nicholas Cid a 35 year-old, female, referred for a psychiatric evaluation for binge eating by Miles Ivory CNP.  She is currently following with Jamel Savage CNP for medication management. She is following up with psychology to learn adaptive coping strategies to manage stress, mood, and binge eating. Assessment:  She reports she has been journaling more frequently and there tends to be focus on her mother. She notes the role of food in her relationship with her mother. She reports that she had quality conversations with her  and this appears to have went well. She reports she was able to identify her own lack of confidence and the role that this has played in their marriage. She reports she has started to practice using assertive communication skills at work more often. She notes sitting with the uncomfortableness at work more often. She notes that she is experiencing less dread upon waking up then she used to. She attributes this to a shift in mindset in setting small goals versus allowing herself to be overwhelmed and \"run. \" She notes difficulties trusting her feelings and a fear of \"freaking out. \"     She notes feeling \"tired but follow up visit scheduled with writer on 4/27. Discussed potentially spacing visits out following 4/27 appointment since patient has had stable mood.        Psychology Clinician:  Jessenia King, PhD     Start time: 10:30am  End time: 11:08am

## 2022-04-19 ENCOUNTER — TELEMEDICINE (OUTPATIENT)
Dept: PSYCHIATRY | Age: 37
End: 2022-04-19
Payer: COMMERCIAL

## 2022-04-19 DIAGNOSIS — F31.81 BIPOLAR 2 DISORDER (HCC): Primary | ICD-10-CM

## 2022-04-19 DIAGNOSIS — F60.5 OBSESSIVE COMPULSIVE PERSONALITY DISORDER (HCC): ICD-10-CM

## 2022-04-19 DIAGNOSIS — F41.1 GAD (GENERALIZED ANXIETY DISORDER): ICD-10-CM

## 2022-04-19 PROCEDURE — 99214 OFFICE O/P EST MOD 30 MIN: CPT | Performed by: NURSE PRACTITIONER

## 2022-04-19 PROCEDURE — 90833 PSYTX W PT W E/M 30 MIN: CPT | Performed by: NURSE PRACTITIONER

## 2022-04-19 RX ORDER — FLUOXETINE HYDROCHLORIDE 40 MG/1
80 CAPSULE ORAL DAILY
Qty: 60 CAPSULE | Refills: 1 | Status: SHIPPED | OUTPATIENT
Start: 2022-04-19 | End: 2022-05-25 | Stop reason: SDUPTHER

## 2022-04-19 RX ORDER — BUSPIRONE HYDROCHLORIDE 30 MG/1
30 TABLET ORAL 2 TIMES DAILY
Qty: 60 TABLET | Refills: 1 | Status: SHIPPED | OUTPATIENT
Start: 2022-04-19 | End: 2022-05-25 | Stop reason: SDUPTHER

## 2022-04-19 RX ORDER — LAMOTRIGINE 25 MG/1
TABLET ORAL
Qty: 42 TABLET | Refills: 0 | Status: SHIPPED | OUTPATIENT
Start: 2022-04-19 | End: 2022-05-20

## 2022-04-19 RX ORDER — HYDROXYZINE PAMOATE 25 MG/1
25-50 CAPSULE ORAL 3 TIMES DAILY PRN
Qty: 270 CAPSULE | Refills: 1 | Status: SHIPPED | OUTPATIENT
Start: 2022-04-19 | End: 2022-05-25 | Stop reason: SDUPTHER
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
## 2022-04-27 ENCOUNTER — TELEMEDICINE (OUTPATIENT)
Dept: PSYCHOLOGY | Age: 37
End: 2022-04-27
Payer: COMMERCIAL

## 2022-04-27 DIAGNOSIS — F39 MOOD DISORDER (HCC): ICD-10-CM

## 2022-04-27 DIAGNOSIS — F60.5 OBSESSIVE COMPULSIVE PERSONALITY DISORDER (HCC): Primary | ICD-10-CM

## 2022-04-27 DIAGNOSIS — F50.89 OTHER SPECIFIED EATING DISORDER: ICD-10-CM

## 2022-04-27 PROCEDURE — 90832 PSYTX W PT 30 MINUTES: CPT | Performed by: PSYCHOLOGIST

## 2022-04-27 NOTE — PROGRESS NOTES
Penny Jackson, was evaluated through a synchronous (real-time) audio-video encounter. The patient (or guardian if applicable) is aware that this is a billable service, which includes applicable co-pays. This Virtual Visit was conducted with patient's (and/or legal guardian's) consent. The visit was conducted pursuant to the emergency declaration under the [facility], [address] [place] [address] authority and the [employer] Act. Patient identification was verified, and a caregiver was present when appropriate. The patient was located in a state where the provider was licensed to provide care.          PSYCHOLOGICAL FOLLOW- [facility] [address] and eating behaviors     History of Presenting Illness:   Obdulia Villarreal a 78-year-old, female, referred for a psychiatric evaluation for binge eating by Micaela Salgado CNP.  She is currently following with [facility], CNP for medication management. She is following up with psychology to learn adaptive coping strategies to manage stress, mood, and binge eating. Assessment:  She reports multiple good things have happened since last visit. She reports she made a deadline at work well in advance. She is unsure if this is helpful in managing her mood and feeling less overwhelmed. She reports things have been going \"pretty good. \"     She reports she was at her parents for her birthday, and notes she is able to move on from things quicker. She notes she is unsure if she will be able to ever tell her parents her thoughts/emotions. Psychiatric medications: Buspar 30mg BID; Prozac 80mg QD; and Lamictal 25mg     She also reports having a purple energy dominic ring.        MENTAL STATUS EXAM   General appearance: dressed appropriately, well-groomed, nose ring  Attitude & Behavior: pleasant and cooperative  Motor Activity & Musculoskeletal: no abnormal movements  Speech & Language: normal rate, volume, and prosody  Gait & Station: sitting  Mood: calm to content  Affect: congruent with mood  Thought content: appropriate  Suicidal ideation: denies  Homicidal ideation: denies  Thought process & Associations: logical, coherent, goal-directed  Perceptions: normal  Orientation: to person, place, and time  Attention & Concentration: intact  Fund of knowledge: average  Insight: adequate  Judgment: adequate     DSM DIAGNOSIS:  Obsessive compulsive personality disorder  Mood disorder, unspecified   Other specified eating disorder, binge eating occurs less than once per week       R/O social anxiety; manic episode    INTERVENTION:   She reports that she has been able to continue to write in her gratitude journal and this has been very helpful for her. She also started a scrolling to do list at work and this has been more helpful than doing a daily list. Provided positive reinforcement for practicing assertiveness. She reports she enjoys standing her ground. SUMMARY/PLAN:   Patient appears to be doing well from a mood perspective. Denies any significant upcoming life concerns. Patient is scheduled for a follow up visit near the end of June for a booster session. She is scheduled to see JACKIE Obrien in May.        Psychology Clinician:  Jose Valiente, PhD     Start time: 10:30am  End time: 10:57am

## 2022-05-20 RX ORDER — BUSPIRONE HYDROCHLORIDE 30 MG/1
TABLET ORAL
Qty: 60 TABLET | Refills: 1 | OUTPATIENT
Start: 2022-05-20

## 2022-05-20 RX ORDER — FLUOXETINE HYDROCHLORIDE 40 MG/1
CAPSULE ORAL
Qty: 60 CAPSULE | Refills: 1 | OUTPATIENT
Start: 2022-05-20

## 2022-05-20 NOTE — TELEPHONE ENCOUNTER
[address] [facility] is requesting a refill on the following medications:  Requested Prescriptions     Pending Prescriptions Disp Refills    lamoTRIgine (LAMICTAL) 25 MG tablet [Pharmacy Med Name: LAMOTRIGINE 25 MG TABLET] 60 tablet 0     Sig: Take 2 tablets by mouth daily       Date of last visit: 4/19/2022  Date of next visit (if applicable):5/25/2022  Pharmacy Name: [facility], [address] [place] [address] [place]

## 2022-05-24 RX ORDER — LAMOTRIGINE 25 MG/1
50 TABLET ORAL DAILY
Qty: 60 TABLET | Refills: 0 | Status: SHIPPED | OUTPATIENT
Start: 2022-05-24 | End: 2022-05-25 | Stop reason: SDUPTHER

## 2022-05-25 ENCOUNTER — TELEMEDICINE (OUTPATIENT)
Dept: PSYCHIATRY | Age: 37
End: 2022-05-25
Payer: COMMERCIAL

## 2022-05-25 DIAGNOSIS — F60.5 OBSESSIVE COMPULSIVE PERSONALITY DISORDER (HCC): ICD-10-CM

## 2022-05-25 DIAGNOSIS — F41.1 GAD (GENERALIZED ANXIETY DISORDER): ICD-10-CM

## 2022-05-25 DIAGNOSIS — F31.81 BIPOLAR 2 DISORDER (HCC): Primary | ICD-10-CM

## 2022-05-25 PROCEDURE — 90833 PSYTX W PT W E/M 30 MIN: CPT | Performed by: NURSE PRACTITIONER

## 2022-05-25 PROCEDURE — 99214 OFFICE O/P EST MOD 30 MIN: CPT | Performed by: NURSE PRACTITIONER

## 2022-05-25 RX ORDER — FLUOXETINE HYDROCHLORIDE 40 MG/1
80 CAPSULE ORAL DAILY
Qty: 60 CAPSULE | Refills: 1 | Status: SHIPPED | OUTPATIENT
Start: 2022-05-25 | End: 2022-07-13 | Stop reason: SDUPTHER

## 2022-05-25 RX ORDER — LAMOTRIGINE 25 MG/1
50 TABLET ORAL DAILY
Qty: 60 TABLET | Refills: 0 | Status: SHIPPED | OUTPATIENT
Start: 2022-05-25 | End: 2022-06-28

## 2022-05-25 RX ORDER — HYDROXYZINE PAMOATE 25 MG/1
25-50 CAPSULE ORAL 3 TIMES DAILY PRN
Qty: 270 CAPSULE | Refills: 1 | Status: SHIPPED | OUTPATIENT
Start: 2022-05-25 | End: 2022-07-13 | Stop reason: SDUPTHER

## 2022-05-25 RX ORDER — BUSPIRONE HYDROCHLORIDE 30 MG/1
30 TABLET ORAL 2 TIMES DAILY
Qty: 60 TABLET | Refills: 1 | Status: SHIPPED | OUTPATIENT
Start: 2022-05-25 | End: 2022-07-13 | Stop reason: SDUPTHER
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
## 2022-06-24 ENCOUNTER — TELEMEDICINE (OUTPATIENT)
Dept: PSYCHOLOGY | Age: 37
End: 2022-06-24
Payer: COMMERCIAL

## 2022-06-24 DIAGNOSIS — F39 MOOD DISORDER (HCC): ICD-10-CM

## 2022-06-24 DIAGNOSIS — F50.89 OTHER SPECIFIED EATING DISORDER: ICD-10-CM

## 2022-06-24 DIAGNOSIS — F60.5 OBSESSIVE COMPULSIVE PERSONALITY DISORDER (HCC): Primary | ICD-10-CM

## 2022-06-24 PROCEDURE — 90832 PSYTX W PT 30 MINUTES: CPT | Performed by: PSYCHOLOGIST

## 2022-06-24 NOTE — PROGRESS NOTES
Cece Caraballo, was evaluated through a synchronous (real-time) audio-video encounter. The patient (or guardian if applicable) is aware that this is a billable service, which includes applicable co-pays. This Virtual Visit was conducted with patient's (and/or legal guardian's) consent. The visit was conducted pursuant to the emergency declaration under the [facility] [address] and the [employer] and [email or web address] General Act. Patient identification was verified, and a caregiver was present when appropriate. The patient was located in a state where the provider was licensed to provide care. PSYCHOLOGICAL FOLLOW-UP FOR mood and eating behaviors     History of Presenting Illness:   Libra Zhu a 24-year-old, female, referred for a psychiatric evaluation for binge eating by Franko Russell CNP.  She is currently following with Adriano Andrew CNP for medication management. She is following up with psychology to learn adaptive coping strategies to manage stress, mood, and binge eating. Assessment:  She reports she is not feeling well today and reports she is having severe allergies with headaches and difficulties breathing. She reports she is only taking sinus medications/mucines for this. Mood: she reports her mood has been \"pretty good\" as a whole for the past month, and notes it has been the \"best she has felt for a long time. \" She reports the last 2 days have been impacted and notes that there is a pattern with her menstrual cycle. She reports feeling cranky and shot but this is different from before when she was feeling stressed or anxious. She reports she is now able to let things go more easily than before which she has noticed for the past couple of months. She reports more in control of her thoughts, and emotions. She reports she has even been able to teach her kids emotional coping strategies versus reactively yelling. MENTAL STATUS EXAM   General appearance: dressed appropriately, well-groomed, nose ring  Attitude & Behavior: pleasant and cooperative  Motor Activity & Musculoskeletal: no abnormal movements  Speech & Language: normal rate, volume, and prosody  Gait & Station: sitting  Mood: calm to content  Affect: congruent with mood  Thought content: appropriate  Suicidal ideation: denies  Homicidal ideation: denies  Thought process & Associations: logical, coherent, goal-directed  Perceptions: normal  Orientation: to person, place, and time  Attention & Concentration: intact  Fund of knowledge: average  Insight: adequate  Judgment: adequate     DSM DIAGNOSIS:  Obsessive compulsive personality disorder  Mood disorder, unspecified   Other specified eating disorder, binge eating occurs less than once per week       R/O social anxiety; manic episode; pmdd    INTERVENTION:   Identified rules and beliefs related to marriage and roles/responsibilities. Discussed communicating assertively and being aware of \"knitpicking\" tendencies related to [employer]. Provided positive reinforcement for teaching emotional coping strategies to her children. SUMMARY/PLAN:   Patient has one additional follow up scheduled with writer on 8/10/2022.        Psychology Clinician:  Silvano Givens, PhD     Start time: 10:20am  End time: 10:54am

## 2022-06-27 RX ORDER — BUSPIRONE HYDROCHLORIDE 30 MG/1
TABLET ORAL
Qty: 60 TABLET | Refills: 1 | OUTPATIENT
Start: 2022-06-27

## 2022-06-27 RX ORDER — FLUOXETINE HYDROCHLORIDE 40 MG/1
CAPSULE ORAL
Qty: 60 CAPSULE | Refills: 1 | OUTPATIENT
Start: 2022-06-27

## 2022-06-27 NOTE — TELEPHONE ENCOUNTER
[address] [facility] is requesting a refill on the following medications:  Requested Prescriptions     Pending Prescriptions Disp Refills    lamoTRIgine (LAMICTAL) 25 MG tablet [Pharmacy Med Name: LAMOTRIGINE 25 MG TABLET] 60 tablet 0     Sig: TAKE 2 TABLETS BY MOUTH DAILY     Refused Prescriptions Disp Refills    FLUoxetine (PROZAC) 40 MG capsule [Pharmacy Med Name: FLUOXETINE HCL 40 MG CAPSULE] 60 capsule 1     Sig: TAKE 2 CAPSULES BY MOUTH EVERY DAY    busPIRone (BUSPAR) 30 MG tablet [Pharmacy Med Name: BUSPIRONE HCL 30 MG TABLET] 60 tablet 1     Sig: TAKE 1 TABLET BY MOUTH TWICE A DAY       Date of last visit: 5/25/2022  Date of next visit (if applicable):7/13/2022  Pharmacy Name: Alessia Iyer MA

## 2022-06-28 RX ORDER — LAMOTRIGINE 25 MG/1
50 TABLET ORAL DAILY
Qty: 60 TABLET | Refills: 0 | Status: SHIPPED | OUTPATIENT
Start: 2022-06-28 | End: 2022-07-13 | Stop reason: SDUPTHER

## 2022-07-13 ENCOUNTER — TELEMEDICINE (OUTPATIENT)
Dept: PSYCHIATRY | Age: 37
End: 2022-07-13
Payer: COMMERCIAL

## 2022-07-13 DIAGNOSIS — F60.5 OBSESSIVE COMPULSIVE PERSONALITY DISORDER (HCC): ICD-10-CM

## 2022-07-13 DIAGNOSIS — F31.81 BIPOLAR 2 DISORDER (HCC): Primary | ICD-10-CM

## 2022-07-13 DIAGNOSIS — F41.1 GAD (GENERALIZED ANXIETY DISORDER): ICD-10-CM

## 2022-07-13 PROCEDURE — 90836 PSYTX W PT W E/M 45 MIN: CPT | Performed by: NURSE PRACTITIONER

## 2022-07-13 PROCEDURE — 99214 OFFICE O/P EST MOD 30 MIN: CPT | Performed by: NURSE PRACTITIONER

## 2022-07-13 RX ORDER — FLUOXETINE HYDROCHLORIDE 40 MG/1
80 CAPSULE ORAL DAILY
Qty: 60 CAPSULE | Refills: 1 | Status: SHIPPED | OUTPATIENT
Start: 2022-07-13 | End: 2022-08-24 | Stop reason: SDUPTHER

## 2022-07-13 RX ORDER — LAMOTRIGINE 25 MG/1
50 TABLET ORAL DAILY
Qty: 60 TABLET | Refills: 1 | Status: SHIPPED | OUTPATIENT
Start: 2022-07-13 | End: 2022-08-24 | Stop reason: SDUPTHER

## 2022-07-13 RX ORDER — HYDROXYZINE PAMOATE 25 MG/1
25-50 CAPSULE ORAL 3 TIMES DAILY PRN
Qty: 270 CAPSULE | Refills: 1 | Status: SHIPPED | OUTPATIENT
Start: 2022-07-13 | End: 2022-08-24 | Stop reason: SDUPTHER

## 2022-07-13 RX ORDER — BUSPIRONE HYDROCHLORIDE 30 MG/1
30 TABLET ORAL 2 TIMES DAILY
Qty: 60 TABLET | Refills: 1 | Status: SHIPPED | OUTPATIENT
Start: 2022-07-13 | End: 2022-08-24 | Stop reason: SDUPTHER
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
## 2022-07-27 ENCOUNTER — TELEMEDICINE (OUTPATIENT)
Dept: PSYCHOLOGY | Age: 37
End: 2022-07-27
Payer: COMMERCIAL

## 2022-07-27 DIAGNOSIS — F39 MOOD DISORDER (HCC): ICD-10-CM

## 2022-07-27 DIAGNOSIS — F60.5 OBSESSIVE COMPULSIVE PERSONALITY DISORDER (HCC): Primary | ICD-10-CM

## 2022-07-27 DIAGNOSIS — F50.89 OTHER SPECIFIED EATING DISORDER: ICD-10-CM

## 2022-07-27 PROCEDURE — 90834 PSYTX W PT 45 MINUTES: CPT | Performed by: PSYCHOLOGIST

## 2022-07-27 NOTE — PROGRESS NOTES
Heena Mace, was evaluated through a synchronous (real-time) audio-video encounter. The patient (or guardian if applicable) is aware that this is a billable service, which includes applicable co-pays. This Virtual Visit was conducted with patient's (and/or legal guardian's) consent. The visit was conducted pursuant to the emergency declaration under the [facility] [address] authority and the [employer] Act. Patient identification was verified, and a caregiver was present when appropriate. The patient was located in a state where the provider was licensed to provide care. PSYCHOLOGICAL FOLLOW-UP FOR mood and eating behaviors     History of Presenting Illness:   Heena Mace is a 44year-old, female, referred for a psychiatric evaluation for binge eating by Shayy Boothe CNP. She is currently following with Angie Bauer CNP for medication management. She is following up with psychology to learn adaptive coping strategies to manage stress, mood, and binge eating.      Assessment:  Discussed recent visit home to her parents house that left her with a different reaction than normal.     MENTAL STATUS EXAM   General appearance: dressed appropriately, well-groomed, nose ring  Attitude & Behavior: pleasant and cooperative  Motor Activity & Musculoskeletal: no abnormal movements  Speech & Language: normal rate, volume, and prosody  Gait & Station: sitting  Mood: calm to content  Affect: congruent with mood  Thought content: appropriate  Suicidal ideation: denies  Homicidal ideation: denies  Thought process & Associations: logical, coherent, goal-directed  Perceptions: normal  Orientation: to person, place, and time  Attention & Concentration: intact  Fund of knowledge: average  Insight: adequate  Judgment: adequate     DSM DIAGNOSIS:  Obsessive compulsive personality disorder  Mood disorder, unspecified   Other specified eating disorder, binge eating occurs less than once per week       R/O social anxiety; manic episode; pmdd    INTERVENTION:   Discussed the idea of not liking her parents and feeling angry (towards parents) about this. Patient reports feeling frustrated since she cannot change any of these things that happened. Noted domains of trust, andself-esteem has been critically impacted. She notes her mother was hypercritical of her in one day, and her father was also hypercritical of her in a different way. She reports feeling cheated from childhood. Patient is engaging in multiple problematic thinking patterns (minimizing/. Provided psychoeduation on biological basis of trauma, and fight-or-flight responses and how this can skew our perspective of situations. Discussed boundaries related to family. Homework: review stages of development. Continue to identify rules. Recovery of PTSD. SUMMARY/PLAN:   Patient reports a history of trauma and would benefit from evidenced based psychotherapy to address symptoms of trauma. Patient agrees to engage in evidenced base treatment with writer and scheduled 12 follow up sessions with writer as recommended in treatment protocol.         Psychology Clinician:  Kim Richardson, PhD     Start time: 12:00pm  End time: 12:51pm

## 2022-08-10 ENCOUNTER — TELEMEDICINE (OUTPATIENT)
Dept: PSYCHOLOGY | Age: 37
End: 2022-08-10
Payer: COMMERCIAL

## 2022-08-10 DIAGNOSIS — F39 MOOD DISORDER (HCC): ICD-10-CM

## 2022-08-10 DIAGNOSIS — F50.89 OTHER SPECIFIED EATING DISORDER: ICD-10-CM

## 2022-08-10 DIAGNOSIS — F60.5 OBSESSIVE COMPULSIVE PERSONALITY DISORDER (HCC): Primary | ICD-10-CM

## 2022-08-10 PROCEDURE — 90837 PSYTX W PT 60 MINUTES: CPT | Performed by: PSYCHOLOGIST

## 2022-08-10 NOTE — PROGRESS NOTES
per week       R/O social anxiety; manic episode; pmdd    INTERVENTION:   Reviewed rules as were assigned for homework. Further delineated rules/stuck points. Homework: ABC sheets      SUMMARY/PLAN:   Patient was actively engaged during today's session. She has multiple follow up visits scheduled with writer to address history of trauma.        Psychology Clinician:  Alberto Gonzalez, PhD     Start time: 09:00am  End time: 09:54am

## 2022-08-11 RX ORDER — BUSPIRONE HYDROCHLORIDE 30 MG/1
TABLET ORAL
Qty: 60 TABLET | Refills: 1 | OUTPATIENT
Start: 2022-08-11

## 2022-08-11 RX ORDER — FLUOXETINE HYDROCHLORIDE 40 MG/1
CAPSULE ORAL
Qty: 60 CAPSULE | Refills: 1 | OUTPATIENT
Start: 2022-08-11

## 2022-08-24 ENCOUNTER — TELEMEDICINE (OUTPATIENT)
Dept: PSYCHIATRY | Age: 37
End: 2022-08-24
Payer: COMMERCIAL

## 2022-08-24 DIAGNOSIS — F41.1 GAD (GENERALIZED ANXIETY DISORDER): ICD-10-CM

## 2022-08-24 DIAGNOSIS — F31.81 BIPOLAR 2 DISORDER (HCC): Primary | ICD-10-CM

## 2022-08-24 DIAGNOSIS — F60.5 OBSESSIVE COMPULSIVE PERSONALITY DISORDER (HCC): ICD-10-CM

## 2022-08-24 PROCEDURE — 99214 OFFICE O/P EST MOD 30 MIN: CPT | Performed by: NURSE PRACTITIONER

## 2022-08-24 PROCEDURE — 90833 PSYTX W PT W E/M 30 MIN: CPT | Performed by: NURSE PRACTITIONER

## 2022-08-24 RX ORDER — HYDROXYZINE PAMOATE 25 MG/1
25-50 CAPSULE ORAL 3 TIMES DAILY PRN
Qty: 270 CAPSULE | Refills: 5 | Status: SHIPPED | OUTPATIENT
Start: 2022-08-24

## 2022-08-24 RX ORDER — LAMOTRIGINE 25 MG/1
50 TABLET ORAL DAILY
Qty: 60 TABLET | Refills: 5 | Status: SHIPPED | OUTPATIENT
Start: 2022-08-24

## 2022-08-24 RX ORDER — BUSPIRONE HYDROCHLORIDE 30 MG/1
30 TABLET ORAL 2 TIMES DAILY
Qty: 60 TABLET | Refills: 5 | Status: SHIPPED | OUTPATIENT
Start: 2022-08-24

## 2022-08-24 RX ORDER — FLUOXETINE HYDROCHLORIDE 40 MG/1
80 CAPSULE ORAL DAILY
Qty: 60 CAPSULE | Refills: 5 | Status: SHIPPED | OUTPATIENT
Start: 2022-08-24
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
## 2022-09-13 RX ORDER — HYDROXYZINE PAMOATE 25 MG/1
25-50 CAPSULE ORAL 3 TIMES DAILY PRN
Qty: 540 CAPSULE | OUTPATIENT
Start: 2022-09-13

## 2022-10-19 ENCOUNTER — TELEMEDICINE (OUTPATIENT)
Dept: PSYCHOLOGY | Age: 37
End: 2022-10-19
Payer: COMMERCIAL

## 2022-10-19 DIAGNOSIS — F50.89 OTHER SPECIFIED EATING DISORDER: ICD-10-CM

## 2022-10-19 DIAGNOSIS — F43.9 TRAUMA AND STRESSOR-RELATED DISORDER: Primary | ICD-10-CM

## 2022-10-19 DIAGNOSIS — F60.5 OBSESSIVE COMPULSIVE PERSONALITY DISORDER (HCC): ICD-10-CM

## 2022-10-19 DIAGNOSIS — F39 MOOD DISORDER (HCC): ICD-10-CM

## 2022-10-19 PROCEDURE — 90837 PSYTX W PT 60 MINUTES: CPT | Performed by: PSYCHOLOGIST

## 2022-10-19 NOTE — PROGRESS NOTES
Jeet Sandhu, was evaluated through a synchronous (real-time) audio-video encounter. The patient (or guardian if applicable) is aware that this is a billable service, which includes applicable co-pays. This Virtual Visit was conducted with patient's (and/or legal guardian's) consent. The visit was conducted pursuant to the emergency declaration under the [address] [place] [address] and the [employer] General Act. Patient identification was verified, and a caregiver was present when appropriate. The patient was located in a state where the provider was licensed to provide care. PSYCHOLOGICAL FOLLOW-UP FOR mood and eating behaviors     History of Presenting Illness:   Jeet Sandhu is a 44year-old, female, referred for a psychiatric evaluation for binge eating by Kathie Barrientos CNP. She is currently following with Chacho Flores CNP for medication management. She is following up with psychology to learn adaptive coping strategies to manage stress, mood, and binge eating. Assessment:  She reports she has been feeling overwhelmed and has been actively avoiding things at work. She reports perceiving that there is some underlying issues at work as she has no additional help for all of her responsibilities. She reports symptoms of depression such as low mood, anhedonia, low motivation, and fatigue. [place]: \"nothing. \" Mainly engaging in avoidance strategies.      MENTAL STATUS EXAM   General appearance: dressed appropriately, well-groomed, nose ring  Attitude & Behavior: pleasant and cooperative  Motor Activity & Musculoskeletal: no abnormal movements  Speech & Language: hyperverbal; normal rate, volume, and prosody  Gait & Station: sitting  Mood: calm to content  Affect: congruent with mood  Thought content: appropriate  Suicidal ideation: denies  Homicidal ideation: denies  Thought process & Associations: logical, coherent, goal-directed  Perceptions: normal  Orientation: to person, place, and time  Attention & Concentration: intact  Fund of knowledge: average  Insight: adequate  Judgment: adequate     DSM DIAGNOSIS:  Trauma and stressor related disorder  Obsessive compulsive personality disorder  Mood disorder, unspecified   Other specified eating disorder, binge eating occurs less than once per week       R/O social anxiety; manic episode; pmdd    INTERVENTION:   Completed session 1 of CPT protocol. Homework: write impact statement      SUMMARY/PLAN:   Patient was actively engaged during today's session. She has multiple follow up visits scheduled with writer to address history of trauma.        Psychology Clinician:  Lani Asher, PhD     Start time: 08:30am  End time: 09:30am

## 2022-10-26 ENCOUNTER — TELEMEDICINE (OUTPATIENT)
Dept: PSYCHOLOGY | Age: 37
End: 2022-10-26
Payer: COMMERCIAL

## 2022-10-26 DIAGNOSIS — F50.89 OTHER SPECIFIED EATING DISORDER: ICD-10-CM

## 2022-10-26 DIAGNOSIS — F43.9 TRAUMA AND STRESSOR-RELATED DISORDER: Primary | ICD-10-CM

## 2022-10-26 DIAGNOSIS — F60.5 OBSESSIVE COMPULSIVE PERSONALITY DISORDER (HCC): ICD-10-CM

## 2022-10-26 DIAGNOSIS — F39 MOOD DISORDER (HCC): ICD-10-CM

## 2022-10-26 PROCEDURE — 90834 PSYTX W PT 45 MINUTES: CPT | Performed by: PSYCHOLOGIST

## 2022-10-26 NOTE — PROGRESS NOTES
Wilosn Flower, was evaluated through a synchronous (real-time) audio-video encounter. The patient (or guardian if applicable) is aware that this is a billable service, which includes applicable co-pays. This Virtual Visit was conducted with patient's (and/or legal guardian's) consent. The visit was conducted pursuant to the emergency declaration under the [address] [place] [address] and the [employer] General Act. Patient identification was verified, and a caregiver was present when appropriate. The patient was located in a state where the provider was licensed to provide care. PSYCHOLOGICAL FOLLOW-UP FOR mood and eating behaviors     History of Presenting Illness:   Wilson Flower is a 44year-old, female, referred for a psychiatric evaluation for binge eating by Se Lemons CNP. She is currently following with Cathi Koyanagi, CNP for medication management. She is following up with psychology to learn adaptive coping strategies to manage stress, mood, and binge eating. Assessment:  Received brief updates on psychosocial stressors.     MENTAL STATUS EXAM   General appearance: dressed appropriately, well-groomed, nose ring  Attitude & Behavior: pleasant and cooperative  Motor Activity & Musculoskeletal: no abnormal movements  Speech & Language: hyperverbal; normal rate, volume, and prosody  Gait & Station: sitting  Mood: calm to content  Affect: congruent with mood  Thought content: appropriate  Suicidal ideation: denies  Homicidal ideation: denies  Thought process & Associations: logical, coherent, goal-directed  Perceptions: normal  Orientation: to person, place, and time  Attention & Concentration: intact  Fund of knowledge: average  Insight: adequate  Judgment: adequate     DSM DIAGNOSIS:  Trauma and stressor related disorder  Obsessive compulsive personality disorder  Mood disorder, unspecified   Other specified eating disorder, binge eating occurs less than once per week       R/O social anxiety; manic episode; pmdd    INTERVENTION:   Completed session 2 of CPT protocol. Identified rules and cale connections between thoughts/emotions/situations. Homework: daily ABC worksheets      SUMMARY/PLAN:   Patient was actively engaged during today's session. She has multiple follow up visits scheduled with writer to address history of trauma.        Psychology Clinician:  Ludy Gupta, PhD     Start time: 08:32am  End time: 09:20am

## 2022-11-02 ENCOUNTER — TELEMEDICINE (OUTPATIENT)
Dept: PSYCHOLOGY | Age: 37
End: 2022-11-02
Payer: COMMERCIAL

## 2022-11-02 DIAGNOSIS — F50.89 OTHER SPECIFIED EATING DISORDER: ICD-10-CM

## 2022-11-02 DIAGNOSIS — F39 MOOD DISORDER (HCC): ICD-10-CM

## 2022-11-02 DIAGNOSIS — F43.9 TRAUMA AND STRESSOR-RELATED DISORDER: Primary | ICD-10-CM

## 2022-11-02 DIAGNOSIS — F60.5 OBSESSIVE COMPULSIVE PERSONALITY DISORDER (HCC): ICD-10-CM

## 2022-11-02 PROCEDURE — 90834 PSYTX W PT 45 MINUTES: CPT | Performed by: PSYCHOLOGIST

## 2022-11-02 NOTE — PROGRESS NOTES
Michelle Posadas, was evaluated through a synchronous (real-time) audio-video encounter. The patient (or guardian if applicable) is aware that this is a billable service, which includes applicable co-pays. This Virtual Visit was conducted with patient's (and/or legal guardian's) consent. The visit was conducted pursuant to the emergency declaration under the [address] [place] [address] and the [employer] Act. Patient identification was verified, and a caregiver was present when appropriate. The patient was located in a state where the provider was licensed to provide care. PSYCHOLOGICAL FOLLOW-UP FOR mood and eating behaviors     History of Presenting Illness:   Michelle Posadas is a 44year-old, female, referred for a psychiatric evaluation for binge eating by Haris Stewart CNP. She is currently following with Berta Sotomayor CNP for medication management. She is following up with psychology to learn adaptive coping strategies to manage stress, mood, and binge eating. Assessment:  Received brief updates on psychosocial stressors. She reports she has been increasingly tired due to thyroid issues. She reports she will be getting her thyroid removed next Thursday. She reports she is feeling supported from her  and mother who will be present to help with daily duties.      MENTAL STATUS EXAM   General appearance: dressed appropriately, well-groomed, nose ring  Attitude & Behavior: pleasant and cooperative  Motor Activity & Musculoskeletal: no abnormal movements  Speech & Language: hyperverbal; normal rate, volume, and prosody  Gait & Station: sitting  Mood: overwhelmed, anxious  Affect: congruent with mood  Thought content: appropriate  Suicidal ideation: denies  Homicidal ideation: denies  Thought process & Associations: logical, coherent, goal-directed  Perceptions: normal  Orientation: to person, place, and time  Attention & Concentration: intact  Fund of knowledge: average  Insight: adequate  Judgment: adequate     DSM DIAGNOSIS:  Trauma and stressor related disorder  Obsessive compulsive personality disorder  Mood disorder, unspecified   Other specified eating disorder, binge eating occurs less than once per week       R/O social anxiety; manic episode; pmdd    INTERVENTION: Completed session 3 of CPT protocol. Homework: daily ABC worksheets with challenging reframes    10/28/2022  PCL-5: 54 (clinical threshold is 28 for probably PTSD)  AYO-7: 17  PHQ-9: 20      SUMMARY/PLAN:   Patient was actively engaged during today's session. She has multiple follow up visits scheduled with writer to address history of trauma.        Psychology Clinician:  Sherri Ureña, PhD     Start time: 08:32am  End time: 09:22am

## 2022-11-09 ENCOUNTER — TELEMEDICINE (OUTPATIENT)
Dept: PSYCHOLOGY | Age: 37
End: 2022-11-09
Payer: COMMERCIAL

## 2022-11-09 DIAGNOSIS — F60.5 OBSESSIVE COMPULSIVE PERSONALITY DISORDER (HCC): ICD-10-CM

## 2022-11-09 DIAGNOSIS — F50.89 OTHER SPECIFIED EATING DISORDER: ICD-10-CM

## 2022-11-09 DIAGNOSIS — F39 MOOD DISORDER (HCC): ICD-10-CM

## 2022-11-09 DIAGNOSIS — F43.9 TRAUMA AND STRESSOR-RELATED DISORDER: Primary | ICD-10-CM

## 2022-11-09 PROCEDURE — 90837 PSYTX W PT 60 MINUTES: CPT | Performed by: PSYCHOLOGIST

## 2022-11-09 NOTE — PROGRESS NOTES
Trevor Love, was evaluated through a synchronous (real-time) audio-video encounter. The patient (or guardian if applicable) is aware that this is a billable service, which includes applicable co-pays. This Virtual Visit was conducted with patient's (and/or legal guardian's) consent. The visit was conducted pursuant to the emergency declaration under the [facility] [address] authority and the [employer] General Act. Patient identification was verified, and a caregiver was present when appropriate. The patient was located in a state where the provider was licensed to provide care. PSYCHOLOGICAL FOLLOW-UP FOR mood and eating behaviors     History of Presenting Illness:   Trevor Love is a 44year-old, female, referred for a psychiatric evaluation for binge eating by Arlene Leon CNP. She is currently following with Leander Bee CNP for medication management. She is following up with psychology to learn adaptive coping strategies to manage stress, mood, and binge eating. Assessment:  Received brief updates on psychosocial stressors. She reports she has been feeling ill over the past week and her whole household was sick.        MENTAL STATUS EXAM   General appearance: dressed appropriately, well-groomed, nose ring  Attitude & Behavior: pleasant and cooperative  Motor Activity & Musculoskeletal: no abnormal movements  Speech & Language: hyperverbal; normal rate, volume, and prosody  Gait & Station: sitting  Mood: overwhelmed, anxious  Affect: congruent with mood  Thought content: appropriate  Suicidal ideation: denies  Homicidal ideation: denies  Thought process & Associations: logical, coherent, goal-directed  Perceptions: normal  Orientation: to person, place, and time  Attention & Concentration: intact  Fund of knowledge: average  Insight: adequate  Judgment: adequate     DSM DIAGNOSIS:  Trauma and stressor related disorder  Obsessive compulsive personality disorder  Mood disorder, unspecified   Other specified eating disorder, binge eating occurs less than once per week       R/O social anxiety; manic episode; pmdd    INTERVENTION: Completed session 4 of CPT protocol. Homework: daily challenging questions worksheets    10/28/2022  PCL-5: 54 (clinical threshold is 28 for probably PTSD)  AYO-7: 17  PHQ-9: 20      SUMMARY/PLAN:   Patient was actively engaged during today's session. She has multiple follow up visits scheduled with writer to address history of trauma.        Psychology Clinician:  Jozef Wren, PhD     Start time: 08:32am  End time: 09:26am

## 2022-11-30 ENCOUNTER — TELEMEDICINE (OUTPATIENT)
Dept: PSYCHOLOGY | Age: 37
End: 2022-11-30

## 2022-11-30 DIAGNOSIS — F43.9 TRAUMA AND STRESSOR-RELATED DISORDER: Primary | ICD-10-CM

## 2022-11-30 DIAGNOSIS — F60.5 OBSESSIVE COMPULSIVE PERSONALITY DISORDER (HCC): ICD-10-CM

## 2022-11-30 DIAGNOSIS — F50.89 OTHER SPECIFIED EATING DISORDER: ICD-10-CM

## 2022-11-30 DIAGNOSIS — F39 MOOD DISORDER (HCC): ICD-10-CM

## 2022-11-30 NOTE — PROGRESS NOTES
Shyann Francis, was evaluated through a synchronous (real-time) audio-video encounter. The patient (or guardian if applicable) is aware that this is a billable service, which includes applicable co-pays. This Virtual Visit was conducted with patient's (and/or legal guardian's) consent. The visit was conducted pursuant to the emergency declaration under the [facility] [address] authority and the [employer] General Act. Patient identification was verified, and a caregiver was present when appropriate. The patient was located in a state where the provider was licensed to provide care. PSYCHOLOGICAL FOLLOW-UP FOR mood and eating behaviors     History of Presenting Illness:   Shyann Francis is a 44year-old, female, referred for a psychiatric evaluation for binge eating by Anuj Page CNP. She is currently following with Dominic Lawson CNP for medication management. She is following up with psychology to learn adaptive coping strategies to manage stress, mood, and binge eating. Assessment:  She did get her thyroid removed following last appointment. 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 She also ended up testing positive for Covid, followed by her young son breaking his clavicle while at . She reports since thyroid removal she has had more energy, less fatigue, and notices it is easier to focus and get going for the day even with multiple significant life stressors.        MENTAL STATUS EXAM   General appearance: dressed appropriately, well-groomed, nose ring  Attitude & Behavior: pleasant and cooperative  Motor Activity & Musculoskeletal: no abnormal movements  Speech & Language: hyperverbal; normal rate, volume, and prosody  Gait & Station: sitting  Mood: overwhelmed, anxious  Affect: congruent with mood  Thought content: appropriate  Suicidal ideation: denies  Homicidal ideation: denies  Thought process & Associations: logical, coherent, goal-directed  Perceptions: normal  Orientation: to person, place, and time  Attention & Concentration: intact  Fund of knowledge: average   Insight: adequate  Judgment: adequate     DSM DIAGNOSIS:  Trauma and stressor related disorder  Obsessive compulsive personality disorder  Mood disorder, unspecified   Other specified eating disorder, binge eating occurs less than once per week       R/O social anxiety; manic episode; pmdd    INTERVENTION: Completed session 5 of CPT protocol. Homework: problematic thinking patterns page     10/28/2022  PCL-5: 54 (clinical threshold is 28 for probably PTSD)  AYO-7: 17  PHQ-9: 20      SUMMARY/PLAN:   Patient was actively engaged during today's session. She has multiple follow up visits scheduled with writer to address history of trauma.        Psychology Clinician:  Omar Monsalve, PhD     Start time: 08:35 am  End time: 09:30 am

## 2022-12-07 ENCOUNTER — TELEMEDICINE (OUTPATIENT)
Dept: PSYCHOLOGY | Age: 37
End: 2022-12-07

## 2022-12-07 DIAGNOSIS — F60.5 OBSESSIVE COMPULSIVE PERSONALITY DISORDER (HCC): ICD-10-CM

## 2022-12-07 DIAGNOSIS — F43.9 TRAUMA AND STRESSOR-RELATED DISORDER: Primary | ICD-10-CM

## 2022-12-07 DIAGNOSIS — F39 MOOD DISORDER (HCC): ICD-10-CM

## 2022-12-07 DIAGNOSIS — F50.89 OTHER SPECIFIED EATING DISORDER: ICD-10-CM

## 2022-12-07 NOTE — PROGRESS NOTES
Ottoniel Louis, was evaluated through a synchronous (real-time) audio-video encounter. The patient (or guardian if applicable) is aware that this is a billable service, which includes applicable co-pays. This Virtual Visit was conducted with patient's (and/or legal guardian's) consent. The visit was conducted pursuant to the emergency declaration under the [facility] [address] authority and the [employer] General Act. Patient identification was verified, and a caregiver was present when appropriate. The patient was located in a state where the provider was licensed to provide care. PSYCHOLOGICAL FOLLOW-UP FOR mood and eating behaviors     History of Presenting Illness:   Ottoniel Louis is a 44year-old, female, referred for a psychiatric evaluation for binge eating by Yasir Simms CNP. She is currently following with Shiva Britton CNP for medication management. She is following up with psychology to learn adaptive coping strategies to manage stress, mood, and binge eating.      Assessment:  Received brief updates on psychosocial stressors      MENTAL STATUS EXAM   General appearance: dressed appropriately, well-groomed, nose ring  Attitude & Behavior: pleasant and cooperative  Motor Activity & Musculoskeletal: no abnormal movements  Speech & Language: hyperverbal; normal rate, volume, and prosody  Gait & Station: sitting  Mood: overwhelmed, anxious  Affect: congruent with mood  Thought content: appropriate  Suicidal ideation: denies  Homicidal ideation: denies  Thought process & Associations: logical, coherent, goal-directed  Perceptions: normal  Orientation: to person, place, and time  Attention & Concentration: intact  Fund of knowledge: average   Insight: adequate  Judgment: adequate     DSM DIAGNOSIS:  Trauma and stressor related disorder  Obsessive compulsive personality disorder  Mood disorder, unspecified   Other specified eating disorder, binge eating occurs less than once per week       R/O social anxiety; manic episode; pmdd    INTERVENTION: Completed session 6 of CPT protocol. Homework: challenging beliefs worksheets    12/07/2022  PCL-5: 50 (clinical threshold is 28 for probably PTSD)  AYO-7: 15  PHQ-9: 17    10/28/2022  PCL-5: 54 (clinical threshold is 28 for probably PTSD)  AYO-7: 17  PHQ-9: 20      SUMMARY/PLAN:   Patient was actively engaged during today's session. She has multiple follow up visits scheduled with writer to address history of trauma.        Psychology Clinician:  Emily De Leon, PhD     Start time: 08:30 am  End time: 09:30 am

## 2023-01-04 ENCOUNTER — TELEMEDICINE (OUTPATIENT)
Dept: PSYCHOLOGY | Age: 38
End: 2023-01-04
Payer: COMMERCIAL

## 2023-01-04 DIAGNOSIS — F60.5 OBSESSIVE COMPULSIVE PERSONALITY DISORDER (HCC): ICD-10-CM

## 2023-01-04 DIAGNOSIS — F39 MOOD DISORDER (HCC): ICD-10-CM

## 2023-01-04 DIAGNOSIS — F43.9 TRAUMA AND STRESSOR-RELATED DISORDER: Primary | ICD-10-CM

## 2023-01-04 DIAGNOSIS — F50.89 OTHER SPECIFIED EATING DISORDER: ICD-10-CM

## 2023-01-04 PROCEDURE — 90837 PSYTX W PT 60 MINUTES: CPT | Performed by: PSYCHOLOGIST

## 2023-01-04 NOTE — PROGRESS NOTES
Blas De Leon, was evaluated through a synchronous (real-time) audio-video encounter. The patient (or guardian if applicable) is aware that this is a billable service, which includes applicable co-pays. This Virtual Visit was conducted with patient's (and/or legal guardian's) consent. The visit was conducted pursuant to the emergency declaration under the [address] [place] [address] and the [employer] General Act. Patient identification was verified, and a caregiver was present when appropriate. The patient was located in a state where the provider was licensed to provide care. PSYCHOLOGICAL FOLLOW-UP FOR mood and eating behaviors     History of Presenting Illness:   Blas De Leon is a 44year-old, female, referred for a psychiatric evaluation for binge eating by Leroy Tafoya CNP. She is currently following with Roberto Fleischer, CNP for medication management. She is following up with psychology to learn adaptive coping strategies to manage stress, mood, and binge eating. Assessment:  She reports she is \"bottoming out\" in regards to her mood. She reports it was difficult to log on this morning following difficult morning with one of her children. She reports that she applied for and interviewed for a job, but did not get it despite her perception that she interviewed well and was qualified.        MENTAL STATUS EXAM   General appearance: dressed appropriately, well-groomed, nose ring  Attitude & Behavior: pleasant and cooperative  Motor Activity & Musculoskeletal: no abnormal movements  Speech & Language: hyperverbal; normal rate, volume, and prosody  Gait & Station: sitting  Mood: overwhelmed, depressed  Affect: congruent with mood  Thought content: appropriate  Suicidal ideation: denies  Homicidal ideation: denies  Thought process & Associations: logical, coherent, goal-directed  Perceptions: normal  Orientation: to person, place, and time  Attention & Concentration: intact  Fund of knowledge: average   Insight: adequate  Judgment: adequate     DSM DIAGNOSIS:  Trauma and stressor related disorder  Obsessive compulsive personality disorder  Mood disorder, unspecified   Other specified eating disorder, binge eating occurs less than once per week       R/O social anxiety; manic episode; pmdd    INTERVENTION:   Processed thoughts and emotions related to holiday events, and employer concerns. These beliefs appear to be related to just world beliefs and fairness. Encouraged patient to have conversation with her boss regarding employment concerns in order to absorb information regarding decision versus challenging the decision that was made. Identified multiple problematic thinking patterns. Homework: challenging beliefs worksheets on safety    12/07/2022  PCL-5: 50 (clinical threshold is 28 for probable PTSD)  AYO-7: 15  PHQ-9: 17    10/28/2022  PCL-5: 54 (clinical threshold is 28 for probable PTSD)  AYO-7: 17  PHQ-9: 20      SUMMARY/PLAN:   Patient was actively engaged during today's session. She has multiple follow up visits scheduled with writer to address history of trauma.        Psychology Clinician:  Yee Chin, PhD     Start time: 08:30 am  End time: 09:30 am

## 2023-01-11 ENCOUNTER — TELEMEDICINE (OUTPATIENT)
Dept: PSYCHOLOGY | Age: 38
End: 2023-01-11
Payer: COMMERCIAL

## 2023-01-11 DIAGNOSIS — F50.89 OTHER SPECIFIED EATING DISORDER: ICD-10-CM

## 2023-01-11 DIAGNOSIS — F43.9 TRAUMA AND STRESSOR-RELATED DISORDER: Primary | ICD-10-CM

## 2023-01-11 DIAGNOSIS — F60.5 OBSESSIVE COMPULSIVE PERSONALITY DISORDER (HCC): ICD-10-CM

## 2023-01-11 DIAGNOSIS — F39 MOOD DISORDER (HCC): ICD-10-CM

## 2023-01-11 PROCEDURE — 90837 PSYTX W PT 60 MINUTES: CPT | Performed by: PSYCHOLOGIST

## 2023-01-11 NOTE — PROGRESS NOTES
Gianluca Baca, was evaluated through a synchronous (real-time) audio-video encounter. The patient (or guardian if applicable) is aware that this is a billable service, which includes applicable co-pays. This Virtual Visit was conducted with patient's (and/or legal guardian's) consent. The visit was conducted pursuant to the emergency declaration under the [facility], [address] [place] [address] authority and the [employer] General Act. Patient identification was verified, and a caregiver was present when appropriate. The patient was located in a state where the provider was licensed to provide care. PSYCHOLOGICAL FOLLOW-UP FOR mood and eating behaviors     History of Presenting Illness:   Gianluca Baca is a 44year-old, female, referred for a psychiatric evaluation for binge eating by Junior Noemi CNP. She is currently following with Shae Maldonado CNP for medication management. She is following up with psychology to learn adaptive coping strategies to manage stress, mood, and binge eating. Assessment:  Received brief updates on psychosocial stressors. She reports extreme difficulties getting out of bed in the morning and feels physically ill when she does get out of bed.        MENTAL STATUS EXAM   General appearance: dressed appropriately, well-groomed, nose ring  Attitude & Behavior: pleasant and cooperative  Motor Activity & Musculoskeletal: no abnormal movements  Speech & Language: hyperverbal; normal rate, volume, and prosody  Gait & Station: sitting  Mood: overwhelmed, depressed  Affect: congruent with mood  Thought content: appropriate  Suicidal ideation: denies  Homicidal ideation: denies  Thought process & Associations: logical, coherent, goal-directed  Perceptions: normal  Orientation: to person, place, and time  Attention & Concentration: intact  Fund of knowledge: average   Insight: adequate  Judgment: adequate     DSM DIAGNOSIS:  Trauma and stressor related disorder  Obsessive compulsive personality disorder  Mood disorder, unspecified   Other specified eating disorder, binge eating occurs less than once per week       R/O social anxiety; manic episode; pmdd; personality disorder (reports fear of abandonment)    INTERVENTION:   Completed session 8 of CPT protocol. Provided positive reinforcement for patient utilizing adaptive coping strategies with her children. Homework: challenging beliefs worksheets related to trust and trust star    Of note, it is atypical at this point in treatment for patient's PCL-5 score to significantly increase. 01/11/2023  PCL-5: 63 (clinical threshold is 28 for probable PTSD)  AYO-7: 19  PHQ-9: 23    12/07/2022  PCL-5: 50 (clinical threshold is 28 for probable PTSD)  AYO-7: 15  PHQ-9: 17    10/28/2022  PCL-5: 54 (clinical threshold is 28 for probable PTSD)  AYO-7: 17  PHQ-9: 20      SUMMARY/PLAN:   Patient was actively engaged during today's session. She has multiple follow up visits scheduled with writer to address history of trauma.        Psychology Clinician:  Carley Cummins, PhD     Start time: 08:30 am  End time: 09:26 am

## 2023-01-18 ENCOUNTER — TELEMEDICINE (OUTPATIENT)
Dept: PSYCHOLOGY | Age: 38
End: 2023-01-18
Payer: COMMERCIAL

## 2023-01-18 DIAGNOSIS — F43.9 TRAUMA AND STRESSOR-RELATED DISORDER: Primary | ICD-10-CM

## 2023-01-18 DIAGNOSIS — F60.5 OBSESSIVE COMPULSIVE PERSONALITY DISORDER (HCC): ICD-10-CM

## 2023-01-18 DIAGNOSIS — F39 MOOD DISORDER (HCC): ICD-10-CM

## 2023-01-18 DIAGNOSIS — F50.89 OTHER SPECIFIED EATING DISORDER: ICD-10-CM

## 2023-01-18 PROCEDURE — 90837 PSYTX W PT 60 MINUTES: CPT | Performed by: PSYCHOLOGIST

## 2023-02-08 ENCOUNTER — TELEMEDICINE (OUTPATIENT)
Dept: PSYCHOLOGY | Age: 38
End: 2023-02-08
Payer: COMMERCIAL

## 2023-02-08 DIAGNOSIS — F43.9 TRAUMA AND STRESSOR-RELATED DISORDER: Primary | ICD-10-CM

## 2023-02-08 DIAGNOSIS — F50.89 OTHER SPECIFIED EATING DISORDER: ICD-10-CM

## 2023-02-08 DIAGNOSIS — F39 MOOD DISORDER (HCC): ICD-10-CM

## 2023-02-08 DIAGNOSIS — F60.5 OBSESSIVE COMPULSIVE PERSONALITY DISORDER (HCC): ICD-10-CM

## 2023-02-08 PROCEDURE — 90837 PSYTX W PT 60 MINUTES: CPT | Performed by: PSYCHOLOGIST

## 2023-02-08 NOTE — PROGRESS NOTES
Rolan Lennon, was evaluated through a synchronous (real-time) audio-video encounter. The patient (or guardian if applicable) is aware that this is a billable service, which includes applicable co-pays. This Virtual Visit was conducted with patient's (and/or legal guardian's) consent. The visit was conducted pursuant to the emergency declaration under the [facility], [address] [place] [address] authority and the [employer] General Act. Patient identification was verified, and a caregiver was present when appropriate. The patient was located in a state where the provider was licensed to provide care. PSYCHOLOGICAL FOLLOW-UP FOR mood and eating behaviors     History of Presenting Illness:   Rolan Lennon is a 44year-old, female, referred for a psychiatric evaluation for binge eating by Jose David Foster CNP. She is currently following with Rocio Gross CNP for medication management. She is following up with psychology to learn adaptive coping strategies to manage stress, mood, and binge eating. Assessment:  She reports she and her whole household has been sick for multiple weeks in a row. She denies being very productive but also denies isolating. She did complete her homework.        MENTAL STATUS EXAM   General appearance: dressed appropriately, well-groomed, nose ring  Attitude & Behavior: pleasant and cooperative  Motor Activity & Musculoskeletal: no abnormal movements  Speech & Language: hyperverbal; normal rate, volume, and prosody  Gait & Station: sitting  Mood: overwhelmed, depressed  Affect: congruent with mood  Thought content: appropriate  Suicidal ideation: denies  Homicidal ideation: denies  Thought process & Associations: logical, coherent, goal-directed  Perceptions: normal  Orientation: to person, place, and time  Attention & Concentration: intact  Fund of knowledge: average   Insight: adequate  Judgment: adequate     DSM DIAGNOSIS:  Trauma and stressor related disorder  Obsessive compulsive personality disorder  Mood disorder, unspecified   Other specified eating disorder, binge eating occurs less than once per week       R/O social anxiety; manic episode; pmdd; personality disorder (reports fear of abandonment)    INTERVENTION:   Completed session 10 of CPT protocol. Discussed power/control    Homework: challenging beliefs worksheets related to esteem      01/11/2023  PCL-5: 63 (clinical threshold is 28 for probable PTSD)  AYO-7: 19  PHQ-9: 23    12/07/2022  PCL-5: 50 (clinical threshold is 28 for probable PTSD)  AYO-7: 15  PHQ-9: 17    10/28/2022  PCL-5: 54 (clinical threshold is 28 for probable PTSD)  AYO-7: 17  PHQ-9: 20      SUMMARY/PLAN:   Patient was actively engaged during today's session. She has multiple follow up visits scheduled with writer to address history of trauma.        Psychology Clinician:  Bárbara Jerez, PhD     Start time: 08:30 am  End time: 09:26 am

## 2023-02-22 ENCOUNTER — TELEMEDICINE (OUTPATIENT)
Dept: PSYCHIATRY | Age: 38
End: 2023-02-22
Payer: COMMERCIAL

## 2023-02-22 DIAGNOSIS — F60.5 OBSESSIVE COMPULSIVE PERSONALITY DISORDER (HCC): ICD-10-CM

## 2023-02-22 DIAGNOSIS — F31.81 BIPOLAR 2 DISORDER (HCC): Primary | ICD-10-CM

## 2023-02-22 DIAGNOSIS — F41.1 GAD (GENERALIZED ANXIETY DISORDER): ICD-10-CM

## 2023-02-22 PROCEDURE — 90833 PSYTX W PT W E/M 30 MIN: CPT | Performed by: NURSE PRACTITIONER

## 2023-02-22 PROCEDURE — 99214 OFFICE O/P EST MOD 30 MIN: CPT | Performed by: NURSE PRACTITIONER

## 2023-02-22 RX ORDER — FLUOXETINE HYDROCHLORIDE 40 MG/1
80 CAPSULE ORAL DAILY
Qty: 60 CAPSULE | Refills: 2 | Status: SHIPPED | OUTPATIENT
Start: 2023-02-22

## 2023-02-22 RX ORDER — BUSPIRONE HYDROCHLORIDE 30 MG/1
30 TABLET ORAL 2 TIMES DAILY
Qty: 60 TABLET | Refills: 2 | Status: SHIPPED | OUTPATIENT
Start: 2023-02-22

## 2023-02-22 RX ORDER — HYDROXYZINE PAMOATE 25 MG/1
25-50 CAPSULE ORAL 3 TIMES DAILY PRN
Qty: 270 CAPSULE | Refills: 5 | Status: SHIPPED | OUTPATIENT
Start: 2023-02-22

## 2023-02-22 RX ORDER — LAMOTRIGINE 100 MG/1
100 TABLET ORAL DAILY
Qty: 30 TABLET | Refills: 1 | Status: SHIPPED | OUTPATIENT
Start: 2023-02-22
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
## 2023-03-24 ENCOUNTER — TELEMEDICINE (OUTPATIENT)
Dept: PSYCHOLOGY | Age: 38
End: 2023-03-24
Payer: COMMERCIAL

## 2023-03-24 DIAGNOSIS — F43.9 TRAUMA AND STRESSOR-RELATED DISORDER: Primary | ICD-10-CM

## 2023-03-24 DIAGNOSIS — F50.89 OTHER SPECIFIED EATING DISORDER: ICD-10-CM

## 2023-03-24 DIAGNOSIS — F39 MOOD DISORDER (HCC): ICD-10-CM

## 2023-03-24 DIAGNOSIS — F60.5 OBSESSIVE COMPULSIVE PERSONALITY DISORDER (HCC): ICD-10-CM

## 2023-03-24 PROCEDURE — 90832 PSYTX W PT 30 MINUTES: CPT | Performed by: PSYCHOLOGIST

## 2023-03-24 NOTE — PROGRESS NOTES
normal rate, volume, and prosody  Gait & Station: sitting  Mood: overwhelmed, depressed  Affect: congruent with mood  Thought content: appropriate  Suicidal ideation: denies  Homicidal ideation: denies  Thought process & Associations: logical, coherent, goal-directed  Perceptions: normal  Orientation: to person, place, and time  Attention & Concentration: intact  Fund of knowledge: average   Insight: adequate  Judgment: adequate     DSM DIAGNOSIS:  Trauma and stressor related disorder  Obsessive compulsive personality disorder  Mood disorder, unspecified   Other specified eating disorder, binge eating occurs less than once per week       R/O social anxiety; manic episode; pmdd; personality disorder (reports fear of abandonment)    INTERVENTION:   Discussed reasons for not doing homework and processed thoughts and emotions related to this topic. She identifies feeling guilty because she did not complete homework and it is wasting writer's time. Provided psychoeducation on symptoms of maverick vs. Anxiety vs ADD and how the increase in lamotrigine is li,ely helping because the symptoms she was previously describing are likely more bipolar related. Discussed plans for termination of treatment and how this would look in regards to frequency of appointments. Homework: challenging beliefs worksheets related to esteem and intimacy. Complete new final impact statement. 03/24/2023  PCL-5: 34 (clinical threshold is 28 for probable PTSD)  AYO-7: 11  PHQ-9: 11    01/11/2023  PCL-5: 63 (clinical threshold is 28 for probable PTSD)  AYO-7: 19  PHQ-9: 23    12/07/2022  PCL-5: 50 (clinical threshold is 28 for probable PTSD)  AYO-7: 15  PHQ-9: 17    10/28/2022  PCL-5: 54 (clinical threshold is 28 for probable PTSD)  AYO-7: 17  PHQ-9: 20    SUMMARY/PLAN:   Patient was actively engaged during today's session. She has 1 final session of CPT protocol and then will schedule booster sessions.       Psychology Clinician:  Miracle Mccarthy

## 2023-03-29 ENCOUNTER — TELEMEDICINE (OUTPATIENT)
Dept: PSYCHOLOGY | Age: 38
End: 2023-03-29
Payer: COMMERCIAL

## 2023-03-29 ENCOUNTER — TELEMEDICINE (OUTPATIENT)
Dept: PSYCHIATRY | Age: 38
End: 2023-03-29
Payer: COMMERCIAL

## 2023-03-29 DIAGNOSIS — F31.81 BIPOLAR 2 DISORDER (HCC): Primary | ICD-10-CM

## 2023-03-29 DIAGNOSIS — F41.1 GAD (GENERALIZED ANXIETY DISORDER): ICD-10-CM

## 2023-03-29 DIAGNOSIS — F39 MOOD DISORDER (HCC): ICD-10-CM

## 2023-03-29 DIAGNOSIS — F60.5 OBSESSIVE COMPULSIVE PERSONALITY DISORDER (HCC): ICD-10-CM

## 2023-03-29 DIAGNOSIS — F50.89 OTHER SPECIFIED EATING DISORDER: ICD-10-CM

## 2023-03-29 DIAGNOSIS — F43.9 TRAUMA AND STRESSOR-RELATED DISORDER: Primary | ICD-10-CM

## 2023-03-29 PROCEDURE — 99214 OFFICE O/P EST MOD 30 MIN: CPT | Performed by: NURSE PRACTITIONER

## 2023-03-29 PROCEDURE — 90834 PSYTX W PT 45 MINUTES: CPT | Performed by: PSYCHOLOGIST

## 2023-03-29 RX ORDER — LAMOTRIGINE 100 MG/1
TABLET ORAL
Qty: 30 TABLET | Refills: 1 | OUTPATIENT
Start: 2023-03-29

## 2023-03-29 RX ORDER — LAMOTRIGINE 100 MG/1
100 TABLET ORAL DAILY
Qty: 30 TABLET | Refills: 1 | Status: SHIPPED | OUTPATIENT
Start: 2023-03-29
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
## 2023-03-29 NOTE — PROGRESS NOTES
normal rate, volume, and prosody  Gait & Station: sitting  Mood: overwhelmed, depressed  Affect: congruent with mood  Thought content: appropriate  Suicidal ideation: denies  Homicidal ideation: denies  Thought process & Associations: logical, coherent, goal-directed  Perceptions: normal  Orientation: to person, place, and time  Attention & Concentration: intact  Fund of knowledge: average   Insight: adequate  Judgment: adequate     DSM DIAGNOSIS:  Trauma and stressor related disorder  Obsessive compulsive personality disorder  Mood disorder, unspecified   Other specified eating disorder, binge eating occurs less than once per week       R/O social anxiety; manic episode; pmdd; personality disorder (reports fear of abandonment)    INTERVENTION:   Completed final session of CPT. Discussed intimacy and various ways to be intimate other than sexually. Discussed the role of intimacy in her marriage and various ways to have intimacy that is not sexual in nature. Compared current and past impact statement. 03/29/2023  PCL-5: 34 (clinical threshold is 28 for probable PTSD)  AYO-7: 10  PHQ-9: 10    03/24/2023  PCL-5: 34 (clinical threshold is 28 for probable PTSD)  AYO-7: 11  PHQ-9: 11    01/11/2023  PCL-5: 63 (clinical threshold is 28 for probable PTSD)  AYO-7: 19  PHQ-9: 23    12/07/2022  PCL-5: 50 (clinical threshold is 28 for probable PTSD)  AYO-7: 15  PHQ-9: 17    10/28/2022  PCL-5: 54 (clinical threshold is 28 for probable PTSD)  AYO-7: 17  PHQ-9: 20    SUMMARY/PLAN:   Patient was actively engaged during today's session. Patient has completed CPT protocol. Her mood appears to be stable evidenced by behavioral observations, and significant decrease in scores on self-report measures. She has 1 additional booster session scheduled on 4/28/2023.       Psychology Clinician:  Quirino Lara, PhD     Start time: 09:00 am  End time: 09:40 am

## 2023-04-28 ENCOUNTER — TELEMEDICINE (OUTPATIENT)
Dept: PSYCHOLOGY | Age: 38
End: 2023-04-28
Payer: COMMERCIAL

## 2023-04-28 DIAGNOSIS — F39 MOOD DISORDER (HCC): ICD-10-CM

## 2023-04-28 DIAGNOSIS — F43.9 TRAUMA AND STRESSOR-RELATED DISORDER: Primary | ICD-10-CM

## 2023-04-28 DIAGNOSIS — F50.89 OTHER SPECIFIED EATING DISORDER: ICD-10-CM

## 2023-04-28 DIAGNOSIS — F60.5 OBSESSIVE COMPULSIVE PERSONALITY DISORDER (HCC): ICD-10-CM

## 2023-04-28 PROCEDURE — 90832 PSYTX W PT 30 MINUTES: CPT | Performed by: PSYCHOLOGIST

## 2023-04-28 NOTE — PROGRESS NOTES
Tariq Perez, was evaluated through a synchronous (real-time) audio-video encounter. The patient (or guardian if applicable) is aware that this is a billable service, which includes applicable co-pays. This Virtual Visit was conducted with patient's (and/or legal guardian's) consent. The visit was conducted pursuant to the emergency declaration under the [facility] [address] and the [employer] Act. Patient identification was verified, and a caregiver was present when appropriate. The patient was located in a state where the provider was licensed to provide care. PSYCHOLOGICAL FOLLOW-UP FOR mood and eating behaviors     History of Presenting Illness:   Tariq Perez is a 43year-old, female, referred for a psychiatric evaluation for binge eating by Freda Church CNP. She is currently following with Dom Barrios CNP for medication management. She is following up with psychology to learn adaptive coping strategies to manage stress, mood, and binge eating. Assessment:  Received updates on psychosocial stressors. Reports continued improvements in attention/focus, and acceptance of life stressors. She reports improvements in self-confidence both at work and home. Psych meds: she reports taking medications as prescribed     Mood: she reports mood has been \"good. \"    She reports positive reinforcement incorporated into her parenting style has been a very positive change and her children are responding well to this style of parenting. Eating behaviors: she reports improvemnts in food choices, and managing cravings.       MENTAL STATUS EXAM   General appearance: dressed appropriately, well-groomed, nose ring  Attitude & Behavior: pleasant and cooperative  Motor Activity & Musculoskeletal: no abnormal movements  Speech & Language: hyperverbal; normal rate, volume, and prosody  Gait & Station: sitting  Mood:

## 2023-05-01 RX ORDER — LAMOTRIGINE 100 MG/1
TABLET ORAL
Qty: 30 TABLET | Refills: 1 | OUTPATIENT
Start: 2023-05-01

## 2023-05-17 ENCOUNTER — TELEMEDICINE (OUTPATIENT)
Dept: PSYCHIATRY | Age: 38
End: 2023-05-17
Payer: COMMERCIAL

## 2023-05-17 DIAGNOSIS — F31.81 BIPOLAR 2 DISORDER (HCC): Primary | ICD-10-CM

## 2023-05-17 DIAGNOSIS — F60.5 OBSESSIVE COMPULSIVE PERSONALITY DISORDER (HCC): ICD-10-CM

## 2023-05-17 DIAGNOSIS — F41.1 GAD (GENERALIZED ANXIETY DISORDER): ICD-10-CM

## 2023-05-17 PROCEDURE — 99214 OFFICE O/P EST MOD 30 MIN: CPT | Performed by: NURSE PRACTITIONER

## 2023-05-17 RX ORDER — BUSPIRONE HYDROCHLORIDE 30 MG/1
30 TABLET ORAL 2 TIMES DAILY
Qty: 60 TABLET | Refills: 2 | Status: SHIPPED | OUTPATIENT
Start: 2023-05-17

## 2023-05-17 RX ORDER — FLUOXETINE HYDROCHLORIDE 40 MG/1
80 CAPSULE ORAL DAILY
Qty: 60 CAPSULE | Refills: 2 | Status: SHIPPED | OUTPATIENT
Start: 2023-05-17

## 2023-05-17 RX ORDER — LAMOTRIGINE 100 MG/1
100 TABLET ORAL DAILY
Qty: 30 TABLET | Refills: 2 | Status: SHIPPED | OUTPATIENT
Start: 2023-05-17

## 2023-05-17 ASSESSMENT — PATIENT HEALTH QUESTIONNAIRE - PHQ9
SUM OF ALL RESPONSES TO PHQ QUESTIONS 1-9: 0
SUM OF ALL RESPONSES TO PHQ9 QUESTIONS 1 & 2: 0
2. FEELING DOWN, DEPRESSED OR HOPELESS: NOT AT ALL
SUM OF ALL RESPONSES TO PHQ9 QUESTIONS 1 & 2: 0
1. LITTLE INTEREST OR PLEASURE IN DOING THINGS: NOT AT ALL
2. FEELING DOWN, DEPRESSED OR HOPELESS: 0
SUM OF ALL RESPONSES TO PHQ QUESTIONS 1-9: 0
1. LITTLE INTEREST OR PLEASURE IN DOING THINGS: 0

## 2023-05-17 NOTE — PROGRESS NOTES
[address] [place] [address] [address] [facility]  Dept: [phone]  Dept Fax: [phone]: [phone]    Visit Date: 5/17/2023     Jeanne Rubalcava, was evaluated through a synchronous (real-time) audio-video encounter. The patient (or guardian if applicable) is aware that this is a billable service, which includes applicable co-pays. This Virtual Visit was conducted with patient's (and/or legal guardian's) consent. Patient identification was verified, and a caregiver was present when appropriate. The patient was located at Home: [facility] [address]  Provider was located at Home ([place] [address]): [address] [place] [address]:    Chief Complaint   Patient presents with    Mental Health Problem    Follow-up       History obtained from: patient    HISTORY OF PRESENT ILLNESS:    Jeanne Rubalcava is a 43 y.o. female who presents via tele-health audiovisual visit to follow-up on complaints of depression and anxiety. Her last visit was 02/22/2023 as a telehealth visit.     States \"things are good\"  -denies feeling down, sad, depressed  -denies feeling worthless, hopeless, helpless  -Good energy  -Good focus and concentration; states she hasn't felt this focused in a long time  -Able to redirect self if she becomes distracted  -States she is able to prioritize  -Able to start tasks and complete them in a timely manner  -Self esteem has improved  -denies mood swings  -reports she is starting her day with more structure  -denies irritability or agitation  -no impulsivity   -states \"I'm showing up now\" and she is enjoying life    Managing stressors much better    Getting sleep  -able to initiate and maintain sleep well  -feeling rested upon waking  -gets around 6-8 hours of sleep per night  -she isn't hitting snooze  -evening routine is more structured    States \"I've been really, really happy with my progress\"      Denies

## 2023-05-19 ENCOUNTER — TELEPHONE (OUTPATIENT)
Dept: PSYCHIATRY | Age: 38
End: 2023-05-19

## 2023-06-21 ENCOUNTER — TELEMEDICINE (OUTPATIENT)
Dept: PSYCHOLOGY | Age: 38
End: 2023-06-21
Payer: COMMERCIAL

## 2023-06-21 DIAGNOSIS — F50.9 EATING DISORDER, UNSPECIFIED TYPE: ICD-10-CM

## 2023-06-21 DIAGNOSIS — F39 MOOD DISORDER (HCC): ICD-10-CM

## 2023-06-21 DIAGNOSIS — F43.9 TRAUMA AND STRESSOR-RELATED DISORDER: Primary | ICD-10-CM

## 2023-06-21 DIAGNOSIS — F60.5 OBSESSIVE COMPULSIVE PERSONALITY DISORDER (HCC): ICD-10-CM

## 2023-06-21 PROCEDURE — 90832 PSYTX W PT 30 MINUTES: CPT | Performed by: PSYCHOLOGIST

## 2023-06-21 NOTE — PROGRESS NOTES
Anderson Moore, was evaluated through a synchronous (real-time) audio-video encounter. The patient (or guardian if applicable) is aware that this is a billable service, which includes applicable co-pays. This Virtual Visit was conducted with patient's (and/or legal guardian's) consent. Patient identification was verified, and a caregiver was present when appropriate. The patient was located at Other: parked car  Provider was located at [facility] ([place] [address]): [address] [place] [address],  [address] [place] [address], [facility] on 6/21/2023 at 8:34 AM    An electronic signature was used to authenticate this note. PSYCHOLOGICAL FOLLOW-UP FOR mood and eating behaviors     History of Presenting Illness:   Anderson Moore is a 43year-old, female, referred for a psychiatric evaluation for binge eating by Lyubov Leong CNP. She is currently following with Stephanie Cano CNP for medication management. She is following up with psychology to learn adaptive coping strategies to manage stress, mood, and binge eating. Assessment:  She reports she has become more mindful of physiological symptoms of anxiety and is able to walk away to practice diaphragmatic breathing and return. She reports she is also able to prioritize of what is actually important. Denies high need for perfection at this time. She reports increased work at stress as she is acting manager for one area in addition to still having her own regular job duties. She reports she will be done with acting duties until Friday, at least that is the plan. She reports she is getting paid overtime. She reports things at home have been good. She reports continued improvements in parenting styles and has seen positive behaviors as a result of this. She reports improvements in marital distress. She reports she has been able to be more assertive. Eating behaviors: she reports an increase in binge eating behaviors.   She reports everything is going really

## 2023-08-16 ENCOUNTER — TELEMEDICINE (OUTPATIENT)
Dept: PSYCHIATRY | Age: 38
End: 2023-08-16
Payer: COMMERCIAL

## 2023-08-16 DIAGNOSIS — F60.5 OBSESSIVE COMPULSIVE PERSONALITY DISORDER (HCC): ICD-10-CM

## 2023-08-16 DIAGNOSIS — F31.81 BIPOLAR 2 DISORDER (HCC): Primary | ICD-10-CM

## 2023-08-16 DIAGNOSIS — F41.1 GAD (GENERALIZED ANXIETY DISORDER): ICD-10-CM

## 2023-08-16 PROCEDURE — 99214 OFFICE O/P EST MOD 30 MIN: CPT | Performed by: NURSE PRACTITIONER

## 2023-08-16 RX ORDER — BUSPIRONE HYDROCHLORIDE 30 MG/1
30 TABLET ORAL 2 TIMES DAILY
Qty: 60 TABLET | Refills: 2 | Status: SHIPPED | OUTPATIENT
Start: 2023-08-16

## 2023-08-16 RX ORDER — LEVOTHYROXINE SODIUM 0.15 MG/1
150 TABLET ORAL DAILY
COMMUNITY
Start: 2023-06-17

## 2023-08-16 RX ORDER — LAMOTRIGINE 100 MG/1
100 TABLET ORAL DAILY
Qty: 30 TABLET | Refills: 2 | Status: SHIPPED | OUTPATIENT
Start: 2023-08-16

## 2023-08-16 RX ORDER — LOSARTAN POTASSIUM AND HYDROCHLOROTHIAZIDE 12.5; 1 MG/1; MG/1
1 TABLET ORAL EVERY MORNING
COMMUNITY
Start: 2023-04-14

## 2023-08-16 NOTE — PROGRESS NOTES
[address]  Dept: [phone]  Dept Fax: [phone]: [phone]    Visit Date: 8/16/2023     Susie Huerta, was evaluated through a synchronous (real-time) audio-video encounter. The patient (or guardian if applicable) is aware that this is a billable service, which includes applicable co-pays. This Virtual Visit was conducted with patient's (and/or legal guardian's) consent. Patient identification was verified, and a caregiver was present when appropriate. The patient was located at Home: [address] [place] [address]  Provider was located at Home ([address]): [address] [employer] was used to authenticate this note. SUBJECTIVE DATA     CHIEF COMPLAINT:    Chief Complaint   Patient presents with    Mental Health Problem    Follow-up       History obtained from: patient    HISTORY OF PRESENT ILLNESS:    Susie Huerta is a 43 y.o. female who presents via tele-health audiovisual visit to follow-up on complaints of depression and anxiety. Her last visit was 05/17/2023 as a telehealth visit. States \"things are going pretty good. \"  -states she is doing well  -no mood swings  -denies feeling down, sad, depressed  -denies feeling worthless, helpless, hopeless  -good motivation  -focus and concentration are well controlled  -denies irritability or agitation that is disruptive or bothersome  -anxiety is stable  -states \"I feel better\" and finds medications beneficial  -denies any symptoms of maverick or hypomania    Things have been stressful recently  -father's cancer has returned - bladder cancer that has spread to his prostate and his lymphatic system - states she is very worried this time - she is trying to be supportive  -she is busy at work  -she had to put her cat down - she has had the cat for 18 years  -preparing the kids to return to school  -gardening    States that

## 2023-08-18 ENCOUNTER — TELEMEDICINE (OUTPATIENT)
Dept: PSYCHOLOGY | Age: 38
End: 2023-08-18
Payer: COMMERCIAL

## 2023-08-18 DIAGNOSIS — F50.89 OTHER SPECIFIED EATING DISORDER: ICD-10-CM

## 2023-08-18 DIAGNOSIS — F43.9 TRAUMA AND STRESSOR-RELATED DISORDER: Primary | ICD-10-CM

## 2023-08-18 DIAGNOSIS — F39 MOOD DISORDER (HCC): ICD-10-CM

## 2023-08-18 DIAGNOSIS — F60.5 OBSESSIVE COMPULSIVE PERSONALITY DISORDER (HCC): ICD-10-CM

## 2023-08-18 PROCEDURE — 90834 PSYTX W PT 45 MINUTES: CPT | Performed by: PSYCHOLOGIST

## 2023-08-21 RX ORDER — FLUOXETINE HYDROCHLORIDE 40 MG/1
CAPSULE ORAL
Qty: 180 CAPSULE | Refills: 0 | Status: SHIPPED | OUTPATIENT
Start: 2023-08-21

## 2023-08-21 NOTE — PROGRESS NOTES
Dorcas Haque, was evaluated through a synchronous (real-time) audio-video encounter. The patient (or guardian if applicable) is aware that this is a billable service, which includes applicable co-pays. This Virtual Visit was conducted with patient's (and/or legal guardian's) consent. Patient identification was verified, and a caregiver was present when appropriate. The patient was located at Other: [employer] car  Provider was located at [facility] ([address] ): [address] [facility] 21 [facility],  [address] Renuka Genao, PhD on 8/21/2023 at 10:20 AM    An electronic signature was used to authenticate this note. PSYCHOLOGICAL FOLLOW-UP FOR mood and eating behaviors     History of Presenting Illness:   Dorcas Haque is a 43year-old, female, referred for a psychiatric evaluation for binge eating by Elvia Rowland CNP. She is currently following with Rick Black CNP for medication management. She is following up with psychology to learn adaptive coping strategies to manage stress, mood, and binge eating. Assessment:  Patient reports recent life stressors including the return of her father's cancer, family dynamics, and increase in binge eating behaviors.        MENTAL STATUS EXAM   General appearance: dressed appropriately, well-groomed, nose ring  Attitude & Behavior: pleasant and cooperative  Motor Activity & Musculoskeletal: no abnormal movements  Speech & Language: normal rate, volume, and prosody  Gait & Station: sitting  Mood: content  Affect: congruent with mood  Thought content: appropriate  Suicidal ideation: denies  Homicidal ideation: denies  Thought process & Associations: logical, coherent, goal-directed  Perceptions: normal  Orientation: to person, place, and time  Attention & Concentration: intact  Fund of knowledge: average   Insight: adequate  Judgment: adequate     DSM DIAGNOSIS:  Trauma and stressor related disorder  Obsessive compulsive personality disorder  Mood disorder, unspecified

## 2023-08-21 NOTE — TELEPHONE ENCOUNTER
[facility] is requesting a medication refill on Kassie's behalf for Prozac 40mg;#60 with 2 refills;last with a start date of 05/17/2023. The pharmacy marked the last fill on 05/17/2023 for a 90 day supply. Medication is pending your approval for a 90 day supply with 0 refills; please advise otherwise. She is scheduled to return on 11/08/2023 and follows with Dr. Rose Guo. She was last seen on 08/16/23.

## 2023-09-08 ENCOUNTER — TELEMEDICINE (OUTPATIENT)
Dept: PSYCHOLOGY | Age: 38
End: 2023-09-08
Payer: COMMERCIAL

## 2023-09-08 DIAGNOSIS — F43.9 TRAUMA AND STRESSOR-RELATED DISORDER: Primary | ICD-10-CM

## 2023-09-08 DIAGNOSIS — F39 MOOD DISORDER (HCC): ICD-10-CM

## 2023-09-08 DIAGNOSIS — F60.5 OBSESSIVE COMPULSIVE PERSONALITY DISORDER (HCC): ICD-10-CM

## 2023-09-08 DIAGNOSIS — F50.89 OTHER SPECIFIED EATING DISORDER: ICD-10-CM

## 2023-09-08 PROCEDURE — 90834 PSYTX W PT 45 MINUTES: CPT | Performed by: PSYCHOLOGIST

## 2023-09-08 NOTE — PROGRESS NOTES
Susie Huerta, was evaluated through a synchronous (real-time) audio-video encounter. The patient (or guardian if applicable) is aware that this is a billable service, which includes applicable co-pays. This Virtual Visit was conducted with patient's (and/or legal guardian's) consent. Patient identification was verified, and a caregiver was present when appropriate. The patient was located at [facility]  Provider was located at [facility] ([address] [place] [address]): [address] [facility] [address] 21 [facility],  75 [facility]      An electronic signature was used to authenticate this note. PSYCHOLOGICAL FOLLOW-UP FOR mood and eating behaviors     History of Presenting Illness:   Susie Huerta is a 43year-old, female, referred for a psychiatric evaluation for binge eating by Bridgette Moore CNP. She is currently following with Quentin Perry CNP for medication management. She is following up with psychology to learn adaptive coping strategies to manage stress, mood, and binge eating. Assessment:  She reports feeling overwhelmed with the homework. She reports relating to the impulsivity and compulsivity of food addiction. She reports the last 3 weeks have been really hard for her and she was ashamed of this. She reports having 1-2 binges per day. She reports visiting family at the end of August to help with daily chores. She reports her father has been cognitively out of it and this was difficult to see. She reports that her father was also hospitalized with DVT but has since been resolved. He appears to be recovering from DVT and is starting chemo. She reports feeling disappointed that her  did not meet her expectations of chores when she was gone. She reports that one of her 's recently  and she fears this was suicide and is inappropriately relating this to herself.        MENTAL STATUS EXAM   General appearance: dressed appropriately, well-groomed, nose ring  Attitude & Behavior: pleasant and
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
## 2023-09-28 ENCOUNTER — TELEMEDICINE (OUTPATIENT)
Dept: PSYCHOLOGY | Age: 38
End: 2023-09-28
Payer: COMMERCIAL

## 2023-09-28 DIAGNOSIS — F60.5 OBSESSIVE COMPULSIVE PERSONALITY DISORDER (HCC): ICD-10-CM

## 2023-09-28 DIAGNOSIS — F43.9 TRAUMA AND STRESSOR-RELATED DISORDER: Primary | ICD-10-CM

## 2023-09-28 DIAGNOSIS — F39 MOOD DISORDER (HCC): ICD-10-CM

## 2023-09-28 DIAGNOSIS — F50.89 OTHER SPECIFIED EATING DISORDER: ICD-10-CM

## 2023-09-28 PROCEDURE — 90832 PSYTX W PT 30 MINUTES: CPT | Performed by: PSYCHOLOGIST

## 2023-09-28 NOTE — PROGRESS NOTES
Susie Huerta, was evaluated through a synchronous (real-time) audio-video encounter. The patient (or guardian if applicable) is aware that this is a billable service, which includes applicable co-pays. This Virtual Visit was conducted with patient's (and/or legal guardian's) consent. Patient identification was verified, and a caregiver was present when appropriate. The patient was located at Home: [address] [facility] [address]  Provider was located at [address]): [address] [facility]  [address],  75 [facility]        PSYCHOLOGICAL FOLLOW-UP FOR mood and eating behaviors     History of Presenting Illness:   Susie Huerta is a 43year-old, female, referred for a psychiatric evaluation for binge eating by Bridgette Moore CNP. She is currently following with Quentin Perry CNP for medication management. She is following up with psychology to learn adaptive coping strategies to manage stress, mood, and binge eating. 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 She reports this was stressful because this person didn't identify himself as to what group he was in until after the police removed him, and he was carrying a black bag and didn't know what was inside of this bag. She reports she ended up calling the police and police removed the individual.     She reports this was a very scary situation for her and she becomes hypervigilant when returning to the office.        MENTAL STATUS EXAM   General appearance: dressed appropriately, well-groomed, nose ring  Attitude & Behavior: pleasant and cooperative  Motor Activity & Musculoskeletal: no abnormal movements  Speech & Language: normal rate, volume, and prosody  Gait & Station: sitting  Mood: content  Affect: congruent with mood  Thought content: appropriate  Suicidal ideation: denies  Homicidal ideation: denies  Thought process & Associations: logical, coherent, goal-directed  Perceptions:

## 2023-10-06 ENCOUNTER — TELEMEDICINE (OUTPATIENT)
Dept: PSYCHOLOGY | Age: 38
End: 2023-10-06
Payer: COMMERCIAL

## 2023-10-06 DIAGNOSIS — F60.5 OBSESSIVE COMPULSIVE PERSONALITY DISORDER (HCC): ICD-10-CM

## 2023-10-06 DIAGNOSIS — F50.89 OTHER SPECIFIED EATING DISORDER: ICD-10-CM

## 2023-10-06 DIAGNOSIS — F39 MOOD DISORDER (HCC): ICD-10-CM

## 2023-10-06 DIAGNOSIS — F43.9 TRAUMA AND STRESSOR-RELATED DISORDER: Primary | ICD-10-CM

## 2023-10-06 PROCEDURE — 90834 PSYTX W PT 45 MINUTES: CPT | Performed by: PSYCHOLOGIST

## 2023-10-06 NOTE — PROGRESS NOTES
Roxi Watson, was evaluated through a synchronous (real-time) audio-video encounter. The patient (or guardian if applicable) is aware that this is a billable service, which includes applicable co-pays. This Virtual Visit was conducted with patient's (and/or legal guardian's) consent. Patient identification was verified, and a caregiver was present when appropriate. The patient was located at Home: [address] [facility] [address]  Provider was located at [address]): [address] [facility] [address] [facility] [address]  [address],  75 [facility]        PSYCHOLOGICAL FOLLOW-UP FOR mood and eating behaviors     History of Presenting Illness:   Roxi Watson is a 43year-old, female, referred for a psychiatric evaluation for binge eating by Karolina Dunham CNP. She is currently following with Beverly Alvarez CNP for medication management. She is following up with psychology to learn adaptive coping strategies to manage stress, mood, and binge eating. Assessment:  She reports things have been \"not great, not bad. \" She reports she is going through the motions and is concerned that her depression and anxiety is starting to increase. She reports she took yesterday and today off of work. She reports she is no longer using her  for her kids for after school care due to a disagreement with 's family.     MENTAL STATUS EXAM   General appearance: dressed appropriately, well-groomed, nose ring  Attitude & Behavior: pleasant and cooperative  Motor Activity & Musculoskeletal: no abnormal movements  Speech & Language: normal rate, volume, and prosody  Gait & Station: sitting  Mood: content  Affect: congruent with mood  Thought content: appropriate  Suicidal ideation: denies  Homicidal ideation: denies  Thought process & Associations: logical, coherent, goal-directed  Perceptions: normal  Orientation: to person, place, and time  Attention & Concentration: intact  Fund of knowledge: average   Insight: adequate  Judgment: adequate

## 2023-11-08 ENCOUNTER — TELEMEDICINE (OUTPATIENT)
Dept: PSYCHIATRY | Age: 38
End: 2023-11-08
Payer: COMMERCIAL

## 2023-11-08 DIAGNOSIS — F43.0 ACUTE STRESS REACTION: ICD-10-CM

## 2023-11-08 DIAGNOSIS — F41.1 GAD (GENERALIZED ANXIETY DISORDER): ICD-10-CM

## 2023-11-08 DIAGNOSIS — F31.81 BIPOLAR 2 DISORDER (HCC): Primary | ICD-10-CM

## 2023-11-08 DIAGNOSIS — F60.5 OBSESSIVE COMPULSIVE PERSONALITY DISORDER (HCC): ICD-10-CM

## 2023-11-08 PROCEDURE — 90836 PSYTX W PT W E/M 45 MIN: CPT | Performed by: NURSE PRACTITIONER

## 2023-11-08 PROCEDURE — 99214 OFFICE O/P EST MOD 30 MIN: CPT | Performed by: NURSE PRACTITIONER

## 2023-11-08 RX ORDER — FLUOXETINE HYDROCHLORIDE 40 MG/1
80 CAPSULE ORAL DAILY
Qty: 180 CAPSULE | Refills: 0 | Status: SHIPPED | OUTPATIENT
Start: 2023-11-08

## 2023-11-08 RX ORDER — BUSPIRONE HYDROCHLORIDE 30 MG/1
30 TABLET ORAL 2 TIMES DAILY
Qty: 180 TABLET | Refills: 0 | Status: SHIPPED | OUTPATIENT
Start: 2023-11-08

## 2023-11-08 RX ORDER — LAMOTRIGINE 100 MG/1
100 TABLET ORAL DAILY
Qty: 90 TABLET | Refills: 0 | Status: SHIPPED | OUTPATIENT
Start: 2023-11-08

## 2023-11-08 ASSESSMENT — PATIENT HEALTH QUESTIONNAIRE - PHQ9
5. POOR APPETITE OR OVEREATING: 2
SUM OF ALL RESPONSES TO PHQ QUESTIONS 1-9: 14
8. MOVING OR SPEAKING SO SLOWLY THAT OTHER PEOPLE COULD HAVE NOTICED. OR THE OPPOSITE - BEING SO FIDGETY OR RESTLESS THAT YOU HAVE BEEN MOVING AROUND A LOT MORE THAN USUAL: MORE THAN HALF THE DAYS
10. IF YOU CHECKED OFF ANY PROBLEMS, HOW DIFFICULT HAVE THESE PROBLEMS MADE IT FOR YOU TO DO YOUR WORK, TAKE CARE OF THINGS AT HOME, OR GET ALONG WITH OTHER PEOPLE: 2
2. FEELING DOWN, DEPRESSED OR HOPELESS: MORE THAN HALF THE DAYS
SUM OF ALL RESPONSES TO PHQ9 QUESTIONS 1 & 2: 4
9. THOUGHTS THAT YOU WOULD BE BETTER OFF DEAD, OR OF HURTING YOURSELF: NOT AT ALL
7. TROUBLE CONCENTRATING ON THINGS, SUCH AS READING THE NEWSPAPER OR WATCHING TELEVISION: SEVERAL DAYS
8. MOVING OR SPEAKING SO SLOWLY THAT OTHER PEOPLE COULD HAVE NOTICED. OR THE OPPOSITE, BEING SO FIGETY OR RESTLESS THAT YOU HAVE BEEN MOVING AROUND A LOT MORE THAN USUAL: 2
SUM OF ALL RESPONSES TO PHQ QUESTIONS 1-9: 14
2. FEELING DOWN, DEPRESSED OR HOPELESS: 2
3. TROUBLE FALLING OR STAYING ASLEEP: 2
10. IF YOU CHECKED OFF ANY PROBLEMS, HOW DIFFICULT HAVE THESE PROBLEMS MADE IT FOR YOU TO DO YOUR WORK, TAKE CARE OF THINGS AT HOME, OR GET ALONG WITH OTHER PEOPLE: VERY DIFFICULT
6. FEELING BAD ABOUT YOURSELF - OR THAT YOU ARE A FAILURE OR HAVE LET YOURSELF OR YOUR FAMILY DOWN: SEVERAL DAYS
SUM OF ALL RESPONSES TO PHQ9 QUESTIONS 1 & 2: 4
6. FEELING BAD ABOUT YOURSELF - OR THAT YOU ARE A FAILURE OR HAVE LET YOURSELF OR YOUR FAMILY DOWN: 1
SUM OF ALL RESPONSES TO PHQ QUESTIONS 1-9: 14
SUM OF ALL RESPONSES TO PHQ QUESTIONS 1-9: 14
4. FEELING TIRED OR HAVING LITTLE ENERGY: MORE THAN HALF THE DAYS
1. LITTLE INTEREST OR PLEASURE IN DOING THINGS: MORE THAN HALF THE DAYS
1. LITTLE INTEREST OR PLEASURE IN DOING THINGS: 2
4. FEELING TIRED OR HAVING LITTLE ENERGY: 2
9. THOUGHTS THAT YOU WOULD BE BETTER OFF DEAD, OR OF HURTING YOURSELF: 0
SUM OF ALL RESPONSES TO PHQ QUESTIONS 1-9: 14
3. TROUBLE FALLING OR STAYING ASLEEP: MORE THAN HALF THE DAYS
7. TROUBLE CONCENTRATING ON THINGS, SUCH AS READING THE NEWSPAPER OR WATCHING TELEVISION: 1
5. POOR APPETITE OR OVEREATING: MORE THAN HALF THE DAYS

## 2023-11-08 ASSESSMENT — ANXIETY QUESTIONNAIRES
1. FEELING NERVOUS, ANXIOUS, OR ON EDGE: 2
4. TROUBLE RELAXING: 2
3. WORRYING TOO MUCH ABOUT DIFFERENT THINGS: MORE THAN HALF THE DAYS
GAD7 TOTAL SCORE: 13
2. NOT BEING ABLE TO STOP OR CONTROL WORRYING: 2
4. TROUBLE RELAXING: MORE THAN HALF THE DAYS
5. BEING SO RESTLESS THAT IT IS HARD TO SIT STILL: MORE THAN HALF THE DAYS
1. FEELING NERVOUS, ANXIOUS, OR ON EDGE: MORE THAN HALF THE DAYS
IF YOU CHECKED OFF ANY PROBLEMS ON THIS QUESTIONNAIRE, HOW DIFFICULT HAVE THESE PROBLEMS MADE IT FOR YOU TO DO YOUR WORK, TAKE CARE OF THINGS AT HOME, OR GET ALONG WITH OTHER PEOPLE: VERY DIFFICULT
6. BECOMING EASILY ANNOYED OR IRRITABLE: MORE THAN HALF THE DAYS
7. FEELING AFRAID AS IF SOMETHING AWFUL MIGHT HAPPEN: 1
IF YOU CHECKED OFF ANY PROBLEMS ON THIS QUESTIONNAIRE, HOW DIFFICULT HAVE THESE PROBLEMS MADE IT FOR YOU TO DO YOUR WORK, TAKE CARE OF THINGS AT HOME, OR GET ALONG WITH OTHER PEOPLE: VERY DIFFICULT
2. NOT BEING ABLE TO STOP OR CONTROL WORRYING: MORE THAN HALF THE DAYS
7. FEELING AFRAID AS IF SOMETHING AWFUL MIGHT HAPPEN: SEVERAL DAYS
6. BECOMING EASILY ANNOYED OR IRRITABLE: 2
5. BEING SO RESTLESS THAT IT IS HARD TO SIT STILL: 2
3. WORRYING TOO MUCH ABOUT DIFFERENT THINGS: 2
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
## 2023-12-01 ENCOUNTER — TELEMEDICINE (OUTPATIENT)
Dept: PSYCHOLOGY | Age: 38
End: 2023-12-01
Payer: COMMERCIAL

## 2023-12-01 DIAGNOSIS — F39 MOOD DISORDER (HCC): ICD-10-CM

## 2023-12-01 DIAGNOSIS — F50.89 OTHER SPECIFIED EATING DISORDER: ICD-10-CM

## 2023-12-01 DIAGNOSIS — F60.5 OBSESSIVE COMPULSIVE PERSONALITY DISORDER (HCC): ICD-10-CM

## 2023-12-01 DIAGNOSIS — F43.9 TRAUMA AND STRESSOR-RELATED DISORDER: Primary | ICD-10-CM

## 2023-12-01 PROCEDURE — 90834 PSYTX W PT 45 MINUTES: CPT | Performed by: PSYCHOLOGIST

## 2024-02-02 ENCOUNTER — TELEMEDICINE (OUTPATIENT)
Dept: PSYCHOLOGY | Age: 39
End: 2024-02-02
Payer: COMMERCIAL

## 2024-02-02 DIAGNOSIS — F39 MOOD DISORDER (HCC): ICD-10-CM

## 2024-02-02 DIAGNOSIS — F50.89 OTHER SPECIFIED EATING DISORDER: ICD-10-CM

## 2024-02-02 DIAGNOSIS — F43.9 TRAUMA AND STRESSOR-RELATED DISORDER: Primary | ICD-10-CM

## 2024-02-02 DIAGNOSIS — F60.5 OBSESSIVE COMPULSIVE PERSONALITY DISORDER (HCC): ICD-10-CM

## 2024-02-02 PROCEDURE — 90834 PSYTX W PT 45 MINUTES: CPT | Performed by: PSYCHOLOGIST

## 2024-02-02 NOTE — PROGRESS NOTES
Kassie Rashid, was evaluated through a synchronous (real-time) audio-video encounter. The patient (or guardian if applicable) is aware that this is a billable service, which includes applicable co-pays. This Virtual Visit was conducted with patient's (and/or legal guardian's) consent. Patient identification was verified, and a caregiver was present when appropriate.   The patient was located at Home: [address] [place] [address]  Provider was located at Facility (Appt Dept): [facility] [address]  [place] [address]        PSYCHOLOGICAL FOLLOW-UP FOR mood and eating behaviors     History of Presenting Illness:   Kassie Rashid is a 38 year-old, female, referred for a psychiatric evaluation for binge eating by BERNARD Rothman CNP.  She is currently following with Kareen Wright CNP for medication management. She is following up with psychology to learn adaptive coping strategies to manage stress, mood, and binge eating.     Assessment:  She reports significant psychosocial stressors with the health of her parents. She reports her mother was diagnosed with guillain-barré syndrome and is now being treated for this.  Her father is also ill and is undergoing chemotherapy. As a result of this, she reports she was out of state to help her father get back and forth to appointments, and upkeep with the house. She reports both of her parents were admitted to the hospital from [facility] time through the New Year.     She reports her dog recently ran away and her househole has been sick the whole month of January.     She reports interpersonal distress between her and her mother due to her mother's poor health behaviors.       MENTAL STATUS EXAM   General appearance: dressed appropriately, well-groomed, nose ring  Attitude & Behavior: pleasant and cooperative  Motor Activity & Musculoskeletal: no abnormal movements  Speech & Language: normal rate, volume, and prosody  Gait & Station: sitting  Mood: calm to

## 2024-02-05 ENCOUNTER — TELEMEDICINE (OUTPATIENT)
Dept: PSYCHIATRY | Age: 39
End: 2024-02-05

## 2024-02-05 DIAGNOSIS — F60.5 OBSESSIVE COMPULSIVE PERSONALITY DISORDER (HCC): ICD-10-CM

## 2024-02-05 DIAGNOSIS — F31.81 BIPOLAR 2 DISORDER (HCC): Primary | ICD-10-CM

## 2024-02-05 DIAGNOSIS — F41.1 GAD (GENERALIZED ANXIETY DISORDER): ICD-10-CM

## 2024-02-05 DIAGNOSIS — F43.0 ACUTE STRESS REACTION: ICD-10-CM

## 2024-02-05 RX ORDER — LAMOTRIGINE 100 MG/1
100 TABLET ORAL DAILY
Qty: 90 TABLET | Refills: 0 | Status: SHIPPED | OUTPATIENT
Start: 2024-02-05

## 2024-02-05 RX ORDER — FLUOXETINE HYDROCHLORIDE 40 MG/1
80 CAPSULE ORAL DAILY
Qty: 180 CAPSULE | Refills: 0 | Status: SHIPPED | OUTPATIENT
Start: 2024-02-05

## 2024-02-05 RX ORDER — BUSPIRONE HYDROCHLORIDE 30 MG/1
30 TABLET ORAL 2 TIMES DAILY
Qty: 180 TABLET | Refills: 0 | Status: SHIPPED | OUTPATIENT
Start: 2024-02-05

## 2024-02-05 NOTE — PROGRESS NOTES
[facility] PROFESSIONAL [facility] PSYCHIATRY  [address]  [facility] [address]  Dept: [phone]  Dept Fax: [phone]  Loc: [phone]    Visit Date: 2/5/2024     Kassie Rashid, was evaluated through a synchronous (real-time) audio-video encounter. The patient (or guardian if applicable) is aware that this is a billable service, which includes applicable co-pays. This Virtual Visit was conducted with patient's (and/or legal guardian's) consent. Patient identification was verified, and a caregiver was present when appropriate.   The patient was located at Home: [address] [facility] [address]  Provider was located at Home (Appt Dept State): [place]    An electronic signature was used to authenticate this note.      SUBJECTIVE DATA     CHIEF COMPLAINT:    Chief Complaint   Patient presents with    Mental Health Problem    Follow-up       History obtained from: patient    HISTORY OF PRESENT ILLNESS:    Kassie Rashid is a 38 y.o. female who presents via tele-health audiovisual visit to follow-up on complaints of depression and anxiety. Her last visit was 11/08/2023 as a telehealth visit.    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

## 2024-02-27 NOTE — PROGRESS NOTES
This note will not be viewable in [facility] for the following reason(s). This is a Psychotherapy Note. TELEPSYCHOLOGY VISIT -- Audio/Visual (During PVDKF-24 public health emergency)     This session was conducted as a telepsychology visit due to one or more of the following COVID-19 risk factors being present in this patient:  · The patient is aged 61 or older  · The patient reports chronic health problems  · The patient reports immune suppressed or immune compromised status  · The patient reports being at risk or potentially exposed to the virus     Patient Location: Home     Provider Location: [facility]      Patient gave verbal consent for teleservices.      This virtual visit was conducted via interactive/real-time [address] and eating behaviors     History of Presenting Illness:   Charla Swan a 42-year-old, female, referred for a psychiatric evaluation for binge eating by Xander Mata CNP.  She is currently following with Estefanía Helms CNP for medication management. She is following up with psychology to learn adaptive coping strategies to manage stress, mood, and binge eating. Assessment:  She reports the last 2 weeks have been \"good>\" She reports she is struggling with trimming down her to do list. She reports using positive affirmations have been helpful. She rpeorts focusing on the 3 main things for her which is her mental stability, family and how she wants to interact with them, and how to treat her body well. She reports noticing her negative cycles more frequently now after observing these similar negative cycles in a close family member.        MENTAL STATUS EXAM   General appearance: dressed appropriately, well-groomed  Attitude & Behavior: pleasant and cooperative  Motor Activity & Musculoskeletal: no abnormal movements  Speech & Language: normal rate, volume, and prosody  Gait & Station: sitting  Mood: somewhat anxious with work Affect: congruent with mood, appropriate to setting  Thought content: appropriate  Suicidal ideation: denies  Homicidal ideation: denies  Thought process & Associations: logical, goal oriented, coherent  Perceptions: normal  Orientation: to person, place, and time  Attention & Concentration: intact  Fund of knowledge: average  Insight: adequate  Judgment: adequate     DSM DIAGNOSIS:  Obsessive compulsive personality disorder  Other specified eating disorder, binge eating occurs less than once per week       INTERVENTION:   Provided positive reinforcement for mindfulness when completing tasks versus worrying a bout the future. Discussed belief of not feeling good enough and she gives herself \"pep talks\" that have been helpful. She notes that sitting with uncomfortableness is hard for her, but she is making an effort with this. She reports noticing the belief of \"not feeling good enough\" has circled through her mind multiple times since last visit, and she appears to have gained awareness of how much this has impacted her throughout her life. She reports wanting everyone to like her, saying no is okay, and she can be assertive. She also appears to have set boundaries when it comes to complaining about family members. Homework: work on using hydroxyzine, and using coping strategies to manage high anxiety situations. Discussed using preventative breathing to help lower baseline anxiety. Discussed TIPP skills to use in the moment. SUMMARY/PLAN:   Patient continues to make significant progress during session today and appears to have adequate insight. Additional follow up session has been scheduled.         Psychology Clinician:  Adela Horn, PhD     Start time: 1:30 pm  End time: 2:05 pm      Pursuant to the emergency declaration under the [facility], [address] [place] [address] and the [employer] Act, this Virtual Visit Bed/Stretcher in lowest position, wheels locked, appropriate side rails in place/Call bell, personal items and telephone in reach/Instruct patient to call for assistance before getting out of bed/chair/stretcher/Non-slip footwear applied when patient is off stretcher/[place] to call system/Physically safe environment - no spills, clutter or unnecessary equipment/Purposeful proactive rounding/Room/bathroom lighting operational, light cord in reach

## 2024-03-15 ENCOUNTER — TELEMEDICINE (OUTPATIENT)
Dept: PSYCHOLOGY | Age: 39
End: 2024-03-15
Payer: COMMERCIAL

## 2024-03-15 DIAGNOSIS — F50.89 OTHER SPECIFIED EATING DISORDER: ICD-10-CM

## 2024-03-15 DIAGNOSIS — F39 MOOD DISORDER (HCC): ICD-10-CM

## 2024-03-15 DIAGNOSIS — F43.9 TRAUMA AND STRESSOR-RELATED DISORDER: Primary | ICD-10-CM

## 2024-03-15 DIAGNOSIS — F60.5 OBSESSIVE COMPULSIVE PERSONALITY DISORDER (HCC): ICD-10-CM

## 2024-03-15 PROCEDURE — 90834 PSYTX W PT 45 MINUTES: CPT | Performed by: PSYCHOLOGIST

## 2024-03-15 NOTE — PROGRESS NOTES
Kassie Rashid, was evaluated through a synchronous (real-time) audio-video encounter. The patient (or guardian if applicable) is aware that this is a billable service, which includes applicable co-pays. This Virtual Visit was conducted with patient's (and/or legal guardian's) consent. Patient identification was verified, and a caregiver was present when appropriate.   The patient was located at Home: [address] [place] [address]  Provider was located at Facility (Appt Dept): [facility] [address]  [place] [id number]        PSYCHOLOGICAL FOLLOW-UP FOR mood and eating behaviors     History of Presenting Illness:   Kassie Rashid is a 38 year-old, female, referred for a psychiatric evaluation for binge eating by BERNARD Rothman CNP.  She is currently following with Kareen Wright CNP for medication management. She is following up with psychology to learn adaptive coping strategies to manage stress, mood, and binge eating.     Assessment:  She reports she has been in a more negative mindset and has also been battling residual symptoms of influenza B for the past 3 weeks. She reports she has been keeping up with the necessities and basics within the home and caring for herself and others; however, she does not feel like she can do more at this time despite acknowledging that she should do more.     She reports feeling more extremes in moods.      MENTAL STATUS EXAM   General appearance: dressed appropriately, well-groomed, nose ring  Attitude & Behavior: pleasant and cooperative  Motor Activity & Musculoskeletal: no abnormal movements  Speech & Language: normal rate, volume, and prosody  Gait & Station: sitting  Mood: calm to overwhelmed  Affect: congruent with mood  Thought content: appropriate  Suicidal ideation: denies  Homicidal ideation: denies  Thought process & Associations: logical, coherent, goal-directed  Perceptions: normal  Orientation: to person, place, and time  Attention & Concentration: intact  Fund

## 2024-04-10 NOTE — TELEPHONE ENCOUNTER
Addended by: CRISTIAN LÓPEZ on: 8/5/2022 02:30 PM     Modules accepted: Orders     [facility] medication [facility]ill on Kassie's behalf for Vistaril 25mg;#270 with 5 refills;last with a start date of 02/22/23 and last refill date of 01/15/24.    Medication is pending your approval for the same Rx; please advise otherwise. She was last seen on 02/05/24 and follows with Dr. Paul. Scheduled to return on 04/29/24.

## 2024-04-15 RX ORDER — HYDROXYZINE PAMOATE 25 MG/1
25-50 CAPSULE ORAL 3 TIMES DAILY PRN
Qty: 270 CAPSULE | Refills: 1 | Status: SHIPPED | OUTPATIENT
Start: 2024-04-15

## 2024-04-29 ENCOUNTER — TELEMEDICINE (OUTPATIENT)
Dept: PSYCHIATRY | Age: 39
End: 2024-04-29
Payer: COMMERCIAL

## 2024-04-29 DIAGNOSIS — F41.1 GAD (GENERALIZED ANXIETY DISORDER): ICD-10-CM

## 2024-04-29 DIAGNOSIS — F60.5 OBSESSIVE COMPULSIVE PERSONALITY DISORDER (HCC): ICD-10-CM

## 2024-04-29 DIAGNOSIS — G47.00 INSOMNIA, UNSPECIFIED TYPE: ICD-10-CM

## 2024-04-29 DIAGNOSIS — F31.81 BIPOLAR 2 DISORDER (HCC): Primary | ICD-10-CM

## 2024-04-29 PROCEDURE — 99214 OFFICE O/P EST MOD 30 MIN: CPT | Performed by: NURSE PRACTITIONER

## 2024-04-29 RX ORDER — LAMOTRIGINE 150 MG/1
150 TABLET ORAL DAILY
Qty: 90 TABLET | Refills: 0 | Status: SHIPPED | OUTPATIENT
Start: 2024-04-29

## 2024-04-29 RX ORDER — FLUOXETINE HYDROCHLORIDE 40 MG/1
80 CAPSULE ORAL DAILY
Qty: 180 CAPSULE | Refills: 0 | Status: SHIPPED | OUTPATIENT
Start: 2024-04-29

## 2024-04-29 RX ORDER — BUSPIRONE HYDROCHLORIDE 30 MG/1
30 TABLET ORAL 2 TIMES DAILY
Qty: 180 TABLET | Refills: 0 | Status: SHIPPED | OUTPATIENT
Start: 2024-04-29

## 2024-04-29 RX ORDER — HYDROXYZINE PAMOATE 25 MG/1
25-50 CAPSULE ORAL 3 TIMES DAILY PRN
Qty: 270 CAPSULE | Refills: 1 | Status: SHIPPED | OUTPATIENT
Start: 2024-04-29

## 2024-04-29 ASSESSMENT — ANXIETY QUESTIONNAIRES
IF YOU CHECKED OFF ANY PROBLEMS ON THIS QUESTIONNAIRE, HOW DIFFICULT HAVE THESE PROBLEMS MADE IT FOR YOU TO DO YOUR WORK, TAKE CARE OF THINGS AT HOME, OR GET ALONG WITH OTHER PEOPLE: SOMEWHAT DIFFICULT
GAD7 TOTAL SCORE: 5
2. NOT BEING ABLE TO STOP OR CONTROL WORRYING: SEVERAL DAYS
1. FEELING NERVOUS, ANXIOUS, OR ON EDGE: SEVERAL DAYS
7. FEELING AFRAID AS IF SOMETHING AWFUL MIGHT HAPPEN: NOT AT ALL
IF YOU CHECKED OFF ANY PROBLEMS ON THIS QUESTIONNAIRE, HOW DIFFICULT HAVE THESE PROBLEMS MADE IT FOR YOU TO DO YOUR WORK, TAKE CARE OF THINGS AT HOME, OR GET ALONG WITH OTHER PEOPLE: SOMEWHAT DIFFICULT
4. TROUBLE RELAXING: SEVERAL DAYS
6. BECOMING EASILY ANNOYED OR IRRITABLE: SEVERAL DAYS
3. WORRYING TOO MUCH ABOUT DIFFERENT THINGS: SEVERAL DAYS
7. FEELING AFRAID AS IF SOMETHING AWFUL MIGHT HAPPEN: NOT AT ALL
4. TROUBLE RELAXING: SEVERAL DAYS
5. BEING SO RESTLESS THAT IT IS HARD TO SIT STILL: NOT AT ALL
3. WORRYING TOO MUCH ABOUT DIFFERENT THINGS: SEVERAL DAYS
1. FEELING NERVOUS, ANXIOUS, OR ON EDGE: SEVERAL DAYS
5. BEING SO RESTLESS THAT IT IS HARD TO SIT STILL: NOT AT ALL
6. BECOMING EASILY ANNOYED OR IRRITABLE: SEVERAL DAYS
2. NOT BEING ABLE TO STOP OR CONTROL WORRYING: SEVERAL DAYS

## 2024-04-29 ASSESSMENT — PATIENT HEALTH QUESTIONNAIRE - PHQ9
1. LITTLE INTEREST OR PLEASURE IN DOING THINGS: SEVERAL DAYS
SUM OF ALL RESPONSES TO PHQ QUESTIONS 1-9: 1
2. FEELING DOWN, DEPRESSED OR HOPELESS: NOT AT ALL
SUM OF ALL RESPONSES TO PHQ QUESTIONS 1-9: 1
SUM OF ALL RESPONSES TO PHQ9 QUESTIONS 1 & 2: 1
2. FEELING DOWN, DEPRESSED OR HOPELESS: NOT AT ALL
1. LITTLE INTEREST OR PLEASURE IN DOING THINGS: SEVERAL DAYS
SUM OF ALL RESPONSES TO PHQ QUESTIONS 1-9: 1
SUM OF ALL RESPONSES TO PHQ QUESTIONS 1-9: 1
SUM OF ALL RESPONSES TO PHQ9 QUESTIONS 1 & 2: 1

## 2024-04-29 NOTE — PROGRESS NOTES
[facility] PROFESSIONAL [facility] PSYCHIATRY  [address]  [facility] [address]  Dept: [phone]  Dept Fax: [phone]  Loc: [phone]    Visit Date: 4/29/2024     Kassie Rashid, was evaluated through a synchronous (real-time) audio-video encounter. The patient (or guardian if applicable) is aware that this is a billable service, which includes applicable co-pays. This Virtual Visit was conducted with patient's (and/or legal guardian's) consent. Patient identification was verified, and a caregiver was present when appropriate.   The patient was located at Home: [address] [facility] [address]  Provider was located at Home (Appt Dept State): [place]    SUBJECTIVE DATA     CHIEF COMPLAINT:    Chief Complaint   Patient presents with    Mental Health Problem    Follow-up       History obtained from: patient    HISTORY OF PRESENT ILLNESS:    Kassie Rashid is a 39 y.o. female who presents via tele-health audiovisual visit to follow-up on complaints of depression and anxiety. Her last visit was 02/05/2024 as a telehealth visit.    States \"I've been doing okay.\"  -reports overall she is doing okay   -she has noticed poor mood and some mood shifting occurring  -unsure if she wants to make any medication changes  -unsure if the mood shifts are due to self awareness vs poorly controlled mood vs poor sleep    Reading a book about social rhythm therapy and bipolar and recognizes how much these mood shifts impact her life.    States she feels like something is \"out of whack\" but she isn't sure what specifically  -she reports she is not sleeping well; is spending more money than she should; having problems initiating/maintaining sleep; energy has been good. At the same time she feels down and overwhelmed.     States she is tired a lot  -unsure if this is due to poor sleep  -goes to bed around 23:00 and wakes around 03:00 and isn't able to return to sleep - this has been worsening for

## 2024-05-16 ENCOUNTER — TELEMEDICINE (OUTPATIENT)
Dept: PSYCHOLOGY | Age: 39
End: 2024-05-16
Payer: COMMERCIAL

## 2024-05-16 DIAGNOSIS — F39 MOOD DISORDER (HCC): ICD-10-CM

## 2024-05-16 DIAGNOSIS — F60.5 OBSESSIVE COMPULSIVE PERSONALITY DISORDER (HCC): ICD-10-CM

## 2024-05-16 DIAGNOSIS — F50.89 OTHER SPECIFIED EATING DISORDER: ICD-10-CM

## 2024-05-16 DIAGNOSIS — F43.9 TRAUMA AND STRESSOR-RELATED DISORDER: Primary | ICD-10-CM

## 2024-05-16 PROCEDURE — 90834 PSYTX W PT 45 MINUTES: CPT | Performed by: PSYCHOLOGIST

## 2024-05-16 NOTE — PROGRESS NOTES
Kassie Rashid, was evaluated through a synchronous (real-time) audio-video encounter. The patient (or guardian if applicable) is aware that this is a billable service, which includes applicable co-pays. This Virtual Visit was conducted with patient's (and/or legal guardian's) consent. Patient identification was verified, and a caregiver was present when appropriate.   The patient was located at Home: [address] [place] [id number]  Provider was located at Facility (Appt Dept): [facility] [address]  [place] [address]        PSYCHOLOGICAL FOLLOW-UP FOR mood and eating behaviors     History of Presenting Illness:   Kassie Rashid is a 39 year-old, female, referred for a psychiatric evaluation for binge eating by BERNARD Rothman CNP.  She is currently following with Kareen Wright CNP for medication management. She is following up with psychology to learn adaptive coping strategies to manage stress, mood, and binge eating.     Assessment:  Reports she has been identifying with many things related to social interpersonal rhythm therapy (IPSRT) treatment for bipolar disorder. She reports multiple gains in insight and is trying to learn her own rhythms to better manage mood.    She reports she is enjoying her new job in January/February 2024.     She reports she is looking forward to going out of town this weekend to see a concert with friends.     She reports she is now taking her medications at the same time every morning and has noticed improvements.       MENTAL STATUS EXAM   General appearance: dressed appropriately, well-groomed, nose ring  Attitude & Behavior: pleasant and cooperative  Motor Activity & Musculoskeletal: no abnormal movements  Speech & Language: normal rate, volume, and prosody  Gait & Station: sitting  Mood: calm  Affect: congruent with mood  Thought content: appropriate  Suicidal ideation: denies  Homicidal ideation: denies  Thought process & Associations: logical, coherent,

## 2024-07-23 NOTE — PROGRESS NOTES
Chief Complaint:  Sleep Consult for Insomnia    Mallampati airway Class:III  Neck Circumference:17.0 Inches    Redfield sleepiness score 7/23/24: 11.  SAQLI:  63    Diagnostic Data:   PAP Download:   No previous sleep study or PAP therapy.  
schedule patient for polysomnogram in the sleep lab.   -I had a discussion with patient regarding avialable treatment options for her sleep disorder breathing including but not limited to CPAP titration in the sleep lab Vs.Dental appliance placement with referral to a local dentist Vs other available surgical options including Uvulopalatopharyngoplasty, maxillomandibular ostomy and tracheostomy as last option. At the end of discussion, she is not decided on her   treatment if she found to have obstructive sleep apnea at this time.  -We will see Kassie Murguiamper back in 1week after the sleep study to go over the sleep study results and further management options.  -She was educated to practice good sleep hygiene practices. She  was provided with a good sleep hygiene hand out.  -Kassie was advised to make earlier appointment with my clinic if she develops any worsening of sleep symptoms. She verbalizes understanding.  -Kassie was advised to not to drive any motor vehicles or operate heavy equipment until her sleep symptoms are under good control.Kassie Rachid verbalizes understanding.  -She was advised to loose weight by controlling diet and doing exercise.   - Kassie Rashid was educated about my impression and plan. She verbalizes understanding.      -I personally reviewed updated the Past medical hx, Past surgical hx,Social hx, Family hx, Medications, Allergies in the discrete data section of the patient chart along with labs, Pulmonary medicine,Sleep medicine related, Pathological, Microbiological and Radiological investigations.

## 2024-07-24 ENCOUNTER — OFFICE VISIT (OUTPATIENT)
Dept: PULMONOLOGY | Age: 39
End: 2024-07-24
Payer: COMMERCIAL

## 2024-07-24 VITALS
BODY MASS INDEX: 39.6 KG/M2 | TEMPERATURE: 98.2 F | DIASTOLIC BLOOD PRESSURE: 70 MMHG | SYSTOLIC BLOOD PRESSURE: 118 MMHG | HEART RATE: 73 BPM | WEIGHT: 267.4 LBS | HEIGHT: 69 IN | OXYGEN SATURATION: 96 %

## 2024-07-24 DIAGNOSIS — G47.10 HYPERSOMNIA: Primary | ICD-10-CM

## 2024-07-24 DIAGNOSIS — I10 ESSENTIAL HYPERTENSION: ICD-10-CM

## 2024-07-24 DIAGNOSIS — G47.00 INSOMNIA, UNSPECIFIED TYPE: ICD-10-CM

## 2024-07-24 PROCEDURE — 3078F DIAST BP <80 MM HG: CPT | Performed by: INTERNAL MEDICINE

## 2024-07-24 PROCEDURE — 99204 OFFICE O/P NEW MOD 45 MIN: CPT | Performed by: INTERNAL MEDICINE

## 2024-07-24 PROCEDURE — 3074F SYST BP LT 130 MM HG: CPT | Performed by: INTERNAL MEDICINE

## 2024-07-24 RX ORDER — FLUOXETINE 40 MG/1
80 CAPSULE ORAL DAILY
Qty: 180 CAPSULE | Refills: 0 | OUTPATIENT
Start: 2024-07-24

## 2024-07-24 RX ORDER — BUSPIRONE HYDROCHLORIDE 30 MG/1
30 TABLET ORAL 2 TIMES DAILY
Qty: 180 TABLET | Refills: 0 | OUTPATIENT
Start: 2024-07-24

## 2024-07-24 NOTE — PATIENT INSTRUCTIONS
Recommendations/Plan:  -She was educated about sleep restriction therapy and advised to practice to improve her insomnia.  -She was educated about stimulus control therapy and advise to practice to improve her insomnia.  -Schedule patient for Cognitive behavioral therapy consult with Dr. Latia Savage/her psychiatrist Ms. Mathur Chaparritachloe as soon as possible for further evaluation of her anxiety disorder and cognitive behavioral therapy for insomia.  -She was advised to use over the counter mouth guard for her bruxism after checking with her dentist. She verbalizes understanding.  -Will schedule patient for polysomnogram in the sleep lab.   -I had a discussion with patient regarding avialable treatment options for her sleep disorder breathing including but not limited to CPAP titration in the sleep lab Vs.Dental appliance placement with referral to a local dentist Vs other available surgical options including Uvulopalatopharyngoplasty, maxillomandibular ostomy and tracheostomy as last option. At the end of discussion, she is not decided on her   treatment if she found to have obstructive sleep apnea at this time.  -We will see Kassienik Rashid back in 1week after the sleep study to go over the sleep study results and further management options.  -She was educated to practice good sleep hygiene practices. She  was provided with a good sleep hygiene hand out.  -Kassie was advised to make earlier appointment with my clinic if she develops any worsening of sleep symptoms. She verbalizes understanding.  -Kassie was advised to not to drive any motor vehicles or operate heavy equipment until her sleep symptoms are under good control.Kassie Rashid verbalizes understanding.  -She was advised to loose weight by controlling diet and doing exercise.   - Kassie Rashid was educated about my impression and plan. She verbalizes understanding.

## 2024-07-25 RX ORDER — LAMOTRIGINE 150 MG/1
150 TABLET ORAL DAILY
Qty: 90 TABLET | Refills: 0 | Status: SHIPPED | OUTPATIENT
Start: 2024-07-25

## 2024-07-25 NOTE — TELEPHONE ENCOUNTER
Pharmacy is requesting a refill of the following medication:    Lamotrigine 150mg  #30 with no refills to the [facility] in [place].  Previous prescription was sent on 04/29/24. Last fill date of 04/29/24.    Last visit 04/29/24 (No showed 07/22/24 visit)  Next visit 08/21/24    Pending your approval

## 2024-08-08 DIAGNOSIS — G47.10 HYPERSOMNIA: Primary | ICD-10-CM

## 2024-08-08 DIAGNOSIS — I10 ESSENTIAL HYPERTENSION: ICD-10-CM

## 2024-08-14 DIAGNOSIS — G47.10 HYPERSOMNIA: Primary | ICD-10-CM

## 2024-08-14 DIAGNOSIS — I10 ESSENTIAL HYPERTENSION: ICD-10-CM

## 2024-08-21 ENCOUNTER — TELEMEDICINE (OUTPATIENT)
Dept: PSYCHIATRY | Age: 39
End: 2024-08-21

## 2024-08-21 DIAGNOSIS — F41.1 GAD (GENERALIZED ANXIETY DISORDER): ICD-10-CM

## 2024-08-21 DIAGNOSIS — F31.81 BIPOLAR 2 DISORDER (HCC): Primary | ICD-10-CM

## 2024-08-21 DIAGNOSIS — F43.21 GRIEF: ICD-10-CM

## 2024-08-21 RX ORDER — LIOTHYRONINE SODIUM 5 UG/1
5 TABLET ORAL DAILY
COMMUNITY
Start: 2024-08-19

## 2024-08-21 RX ORDER — LAMOTRIGINE 150 MG/1
150 TABLET ORAL DAILY
Qty: 90 TABLET | Refills: 0 | Status: SHIPPED | OUTPATIENT
Start: 2024-08-21

## 2024-08-21 RX ORDER — HYDROXYZINE PAMOATE 25 MG/1
25-50 CAPSULE ORAL 3 TIMES DAILY PRN
Qty: 540 CAPSULE | Refills: 1 | OUTPATIENT
Start: 2024-08-21

## 2024-08-21 RX ORDER — FLUOXETINE 40 MG/1
80 CAPSULE ORAL DAILY
Qty: 180 CAPSULE | Refills: 0 | Status: SHIPPED | OUTPATIENT
Start: 2024-08-21

## 2024-08-21 RX ORDER — HYDROXYZINE PAMOATE 25 MG/1
25-50 CAPSULE ORAL 3 TIMES DAILY PRN
Qty: 270 CAPSULE | Refills: 1 | Status: SHIPPED | OUTPATIENT
Start: 2024-08-21

## 2024-08-21 RX ORDER — BUSPIRONE HYDROCHLORIDE 30 MG/1
30 TABLET ORAL 2 TIMES DAILY
Qty: 180 TABLET | Refills: 0 | Status: SHIPPED | OUTPATIENT
Start: 2024-08-21

## 2024-08-21 ASSESSMENT — ANXIETY QUESTIONNAIRES
7. FEELING AFRAID AS IF SOMETHING AWFUL MIGHT HAPPEN: NOT AT ALL
3. WORRYING TOO MUCH ABOUT DIFFERENT THINGS: SEVERAL DAYS
6. BECOMING EASILY ANNOYED OR IRRITABLE: SEVERAL DAYS
5. BEING SO RESTLESS THAT IT IS HARD TO SIT STILL: NOT AT ALL
2. NOT BEING ABLE TO STOP OR CONTROL WORRYING: NOT AT ALL
7. FEELING AFRAID AS IF SOMETHING AWFUL MIGHT HAPPEN: NOT AT ALL
1. FEELING NERVOUS, ANXIOUS, OR ON EDGE: SEVERAL DAYS
GAD7 TOTAL SCORE: 4
3. WORRYING TOO MUCH ABOUT DIFFERENT THINGS: SEVERAL DAYS
4. TROUBLE RELAXING: SEVERAL DAYS
IF YOU CHECKED OFF ANY PROBLEMS ON THIS QUESTIONNAIRE, HOW DIFFICULT HAVE THESE PROBLEMS MADE IT FOR YOU TO DO YOUR WORK, TAKE CARE OF THINGS AT HOME, OR GET ALONG WITH OTHER PEOPLE: SOMEWHAT DIFFICULT
2. NOT BEING ABLE TO STOP OR CONTROL WORRYING: NOT AT ALL
4. TROUBLE RELAXING: SEVERAL DAYS
6. BECOMING EASILY ANNOYED OR IRRITABLE: SEVERAL DAYS
IF YOU CHECKED OFF ANY PROBLEMS ON THIS QUESTIONNAIRE, HOW DIFFICULT HAVE THESE PROBLEMS MADE IT FOR YOU TO DO YOUR WORK, TAKE CARE OF THINGS AT HOME, OR GET ALONG WITH OTHER PEOPLE: SOMEWHAT DIFFICULT
1. FEELING NERVOUS, ANXIOUS, OR ON EDGE: SEVERAL DAYS
5. BEING SO RESTLESS THAT IT IS HARD TO SIT STILL: NOT AT ALL

## 2024-08-21 ASSESSMENT — PATIENT HEALTH QUESTIONNAIRE - PHQ9
2. FEELING DOWN, DEPRESSED OR HOPELESS: SEVERAL DAYS
SUM OF ALL RESPONSES TO PHQ QUESTIONS 1-9: 2
SUM OF ALL RESPONSES TO PHQ9 QUESTIONS 1 & 2: 2
SUM OF ALL RESPONSES TO PHQ QUESTIONS 1-9: 2
SUM OF ALL RESPONSES TO PHQ QUESTIONS 1-9: 2
SUM OF ALL RESPONSES TO PHQ9 QUESTIONS 1 & 2: 2
1. LITTLE INTEREST OR PLEASURE IN DOING THINGS: SEVERAL DAYS
1. LITTLE INTEREST OR PLEASURE IN DOING THINGS: SEVERAL DAYS
2. FEELING DOWN, DEPRESSED OR HOPELESS: SEVERAL DAYS
SUM OF ALL RESPONSES TO PHQ QUESTIONS 1-9: 2

## 2024-08-21 NOTE — PROGRESS NOTES
Attention Span Attention span and concentration were age appropriate       Screenings Completed in This Encounter:     Anxiety and Depression:             8/21/2024     8:20 AM 4/29/2024     6:29 AM 11/8/2023     7:52 AM   AYO-7 SCREENING   Feeling nervous, anxious, or on edge Several days Several days More than half the days   Not being able to stop or control worrying Not at all Several days More than half the days   Worrying too much about different things Several days Several days More than half the days   Trouble relaxing Several days Several days More than half the days   Being so restless that it is hard to sit still Not at all Not at all More than half the days   Becoming easily annoyed or irritable Several days Several days More than half the days   Feeling afraid as if something awful might happen Not at all Not at all Several days   AYO-7 Total Score 4 5 13   How difficult have these problems made it for you to do your work, take care of things at home, or get along with other people? Somewhat difficult Somewhat difficult Very difficult         PHQ-2 Over the past 2 weeks, how often have you been bothered by any of the following problems?  Little interest or pleasure in doing things: Several days  Feeling down, depressed, or hopeless: Several days  PHQ-2 Score: 2  PHQ-9 Over the past 2 weeks, how often have you been bothered by any of the following problems?  PHQ-9 Total Score: 2  PHQ-9 Total Score: 2  Assessment & Plan   DIAGNOSIS AND ASSESSMENT DATA     DIAGNOSIS:   1. Bipolar 2 disorder (HCC)    2. AYO (generalized anxiety disorder)    3. Grief        PLAN   Follow-up:  Return in about 3 months (around 11/21/2024), or if symptoms worsen or fail to improve, for follow-up and medication management.    Prescriptions for this encounter:  New Prescriptions    No medications on file       Orders Placed This Encounter   Medications    busPIRone (BUSPAR) 30 MG tablet     Sig: Take 30 mg by mouth 2 times daily  counseling and coordination of care regarding topics discussed above. The patient was engaged and responsive throughout session. Utilized CBT, MI and reflective listening to address topics above. The remainder of session spent on symptom evaluation and medication management.      Provider Signature:  Electronically signed by EDITH Mar CNP     **This report has been created using voice recognition software. It may contain minor errors which are inherent in voice recognition technology.**

## 2024-08-23 NOTE — PROGRESS NOTES
This note will not be viewable in [employer] for the following reason(s). This is a Psychotherapy Note. TELEPSYCHOLOGY VISIT -- Audio/Visual (During OJZNY-11 public health emergency)     This session was conducted as a telepsychology visit due to one or more of the following COVID-19 risk factors being present in this patient:  · The patient is aged 61 or older  · The patient reports chronic health problems  · The patient reports immune suppressed or immune compromised status  · The patient reports being at risk or potentially exposed to the virus     Patient Location: Home     Provider Location: [facility]      Patient gave verbal consent for teleservices.         This virtual visit was conducted via interactive/real-time [address] and eating behaviors     History of Presenting Illness:   Luke Evans a 37 year-old, female, referred for a psychiatric evaluation for binge eating by Ana Jack CNP.  She is currently following with Christelle Beckman CNP for medication management. She is following up with psychology to learn adaptive coping strategies to manage stress, mood, and binge eating. Assessment:  She reports her Prozac was recently increased. She noticed that some of her \"bad\" habits were starting to reoccur. She reports she was also put on a mood stabilizer and she is unsure of how helpful this has been at this point in time. She reports she has not wanted to leave the house for the past 2 weeks. Relationship with her children appears to be improving as she is getting better of letting go of her \"control freak nature. \" She reports she will stop what she is doing to play with the kids versus powering through the chores. She reports the mood in the house appears to be much lighter than it used to be. She reports there are 4 house rules that everyone has to follow. She reports this has made her a lot happier.      Work: she reports she has been existing and completely lost her desire/passion for her work. She reports having an overwhelming feeling to avoid and escape work. She reports feeling physically ill when she thinks about work. She notes this is due to her high need for perfectionism. 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 She reports feeling more \"tweaked\" lately and over thinking. She reports thinking about things that she cannot change and/or has no control over. Healthy lifestyle changes: she reports she is trying to be more mindful of her eating behaviors. She reports exercise is \"hit and miss. \" She reports going through spurts where she plans things out and everything goes well for a day or 2, and then it goes away. She reports her biggest concern is work as she feels like she is staying afloat. She reports that she is meeting her deadlines at work, and she is concerned that some interactions are going to complain about her. She reports she has no incentive to try because you can get screwed no matter what you do. This is different from at home because she does get positive reinforcement from her  and family. She reports noticing similar patterns in her social life similar to work. She reports that this has been something that she has been dealing with for awhile but has gotten worse over the past of 1.5 years.      MENTAL STATUS EXAM   General appearance: dressed appropriately, well-groomed  Attitude & Behavior: pleasant and cooperative  Motor Activity & Musculoskeletal: no abnormal movements  Speech & Language: normal rate, volume, and prosody  Gait & Station: sitting  Mood: somewhat anxious with work   Affect: congruent with mood, appropriate to setting  Thought content: appropriate  Suicidal ideation: denies  Homicidal ideation: denies  Thought process & Associations: logical, goal oriented, coherent  Perceptions: normal  Orientation: to person, place, and time  Attention & Concentration: intact  Fund of knowledge: average  Insight: adequate  Judgment: adequate     DSM DIAGNOSIS:  Obsessive compulsive personality disorder  Other specified eating disorder, binge eating occurs less than once per week       INTERVENTION:   Discussed what she can do and what she does have control over while at work. Discussed the role of avoidance and emotions. Emailed patient multiple handouts and worksheets to complete. Discussed how this will promote more neutrality versus overly high points, and pessimistic view points. SUMMARY/PLAN:   Patient appears to be having difficulties at work, and home that are related to control. Discussed multiple things that she can try to manage these symptoms. Patient is in agreement. Psychology Clinician:  Ju Roa, PhD     Start time: 11:30 am  End time: 12:24 pm      Pursuant to the emergency declaration under the [facility] waiver authority and the [employer] Act, this Virtual Visit was conducted, with patient's consent, to reduce the patient's risk of exposure to COVID-19 and provide continuity of care for an established patient. Services were provided through a video synchronous discussion virtually to substitute for in-person clinic visit. no

## 2024-09-12 ENCOUNTER — TELEMEDICINE (OUTPATIENT)
Dept: PSYCHOLOGY | Age: 39
End: 2024-09-12
Payer: COMMERCIAL

## 2024-09-12 DIAGNOSIS — F43.9 TRAUMA AND STRESSOR-RELATED DISORDER: Primary | ICD-10-CM

## 2024-09-12 DIAGNOSIS — F50.89 OTHER SPECIFIED EATING DISORDER: ICD-10-CM

## 2024-09-12 DIAGNOSIS — F39 MOOD DISORDER (HCC): ICD-10-CM

## 2024-09-12 DIAGNOSIS — F60.5 OBSESSIVE COMPULSIVE PERSONALITY DISORDER (HCC): ICD-10-CM

## 2024-09-12 PROCEDURE — 90837 PSYTX W PT 60 MINUTES: CPT | Performed by: PSYCHOLOGIST

## 2024-10-04 ENCOUNTER — TELEPHONE (OUTPATIENT)
Dept: PULMONOLOGY | Age: 39
End: 2024-10-04

## 2024-10-04 NOTE — TELEPHONE ENCOUNTER
Received letter from [facility] sleep labs stating the patient insurance denied the home sleep study, please advise. Letter is attached to this message.

## 2024-10-09 NOTE — TELEPHONE ENCOUNTER
Sent HST order to [facility] to reach out to patient to get scheduled. Follow up with you in [facility] is for 10/24 we may need to reschedule depending on [facility] sleep appt.

## 2024-10-14 DIAGNOSIS — G47.34 IDIOPATHIC SLEEP RELATED NONOBSTRUCTIVE ALVEOLAR HYPOVENTILATION: Primary | ICD-10-CM

## 2024-10-14 DIAGNOSIS — G47.34 NOCTURNAL HYPOXIA: ICD-10-CM

## 2024-10-16 ENCOUNTER — TELEPHONE (OUTPATIENT)
Dept: PULMONOLOGY | Age: 39
End: 2024-10-16

## 2024-10-16 NOTE — TELEPHONE ENCOUNTER
Called Pt to inform about Nocturnal O2. She had a lot of questions. I read some of the sleep study to her and sent the order to [facility].  She has an appt with you next week.

## 2024-10-24 ENCOUNTER — OFFICE VISIT (OUTPATIENT)
Dept: PULMONOLOGY | Age: 39
End: 2024-10-24
Payer: COMMERCIAL

## 2024-10-24 VITALS
SYSTOLIC BLOOD PRESSURE: 118 MMHG | HEIGHT: 69 IN | DIASTOLIC BLOOD PRESSURE: 80 MMHG | TEMPERATURE: 97.2 F | HEART RATE: 69 BPM | WEIGHT: 264.6 LBS | OXYGEN SATURATION: 98 % | BODY MASS INDEX: 39.19 KG/M2

## 2024-10-24 DIAGNOSIS — G47.00 INSOMNIA, UNSPECIFIED TYPE: ICD-10-CM

## 2024-10-24 DIAGNOSIS — G47.34 IDIOPATHIC SLEEP RELATED NONOBSTRUCTIVE ALVEOLAR HYPOVENTILATION: ICD-10-CM

## 2024-10-24 DIAGNOSIS — J34.2 DNS (DEVIATED NASAL SEPTUM): Primary | ICD-10-CM

## 2024-10-24 DIAGNOSIS — J34.2 DNS (DEVIATED NASAL SEPTUM): ICD-10-CM

## 2024-10-24 PROCEDURE — 3079F DIAST BP 80-89 MM HG: CPT | Performed by: INTERNAL MEDICINE

## 2024-10-24 PROCEDURE — 3074F SYST BP LT 130 MM HG: CPT | Performed by: INTERNAL MEDICINE

## 2024-10-24 PROCEDURE — 99214 OFFICE O/P EST MOD 30 MIN: CPT | Performed by: INTERNAL MEDICINE

## 2024-10-24 RX ORDER — PHENOL 1.4 %
AEROSOL, SPRAY (ML) MUCOUS MEMBRANE NIGHTLY
COMMUNITY

## 2024-10-24 NOTE — PROGRESS NOTES
Chief Complaint: 3 month sleep follow up with HST 10/11/24.    Mallampati airway Class: 17 inches  Neck Circumference: 4     [place] sleepiness score 10/24/24: ***  SAQLI: ***        
      /80 (Site: Left Upper Arm, Position: Sitting, Cuff Size: Medium Adult)   Pulse 69   Temp 97.2 °F (36.2 °C) (Skin)   Ht 1.753 m (5' 9\")   Wt 120 kg (264 lb 9.6 oz)   SpO2 98% Comment: Patient on room air  BMI 39.07 kg/m²   BMI:  Body mass index is 39.07 kg/m².     Mallampati airway Class: 17 inches  Neck Circumference: 4   [place] sleepiness score 10/24/24: 11- (On further questioning she denies any hypersomnia ( Excessive daytime sleepiness).  SAQLI: 49    Physical Exam :  Constitutional: Patient appears moderately built and moderately nourished. No distress. Patient is oriented to person, place, and time.  HENT: Hypertrophied nasal turbinates with pale nasal mucosa bilaterally.  Head: Normocephalic and atraumatic.   Right Ear: External ear normal.   Left Ear: External ear normal.   Mouth/Throat: Oropharynx is clear and moist.   Eyes: Conjunctivae are normal. Pupils are equal and reactive to light. No scleral icterus.   Neck: Neck supple. No JVD present.   Cardiovascular: Normal rate, regular rhythm, normal heart sounds. No murmur heard.   Pulmonary/Chest: Effort normal and breath sounds normal. No stridor. No respiratory distress.  No wheezes. No rales.   Abdominal: Soft. Patient exhibits no distension. No tenderness.   Musculoskeletal: Normal range of motion.   Extremities: Patient exhibits no erythema or no edema.   Lymphadenopathy:  No cervical adenopathy.   Neurological: Patient is alert and oriented to person, place, and time.   Skin: Skin is warm and dry. Patient is not diaphoretic.   Psychiatric: Patient  has a normal mood and affect.    Diagnostic Data:    Sleep study done on : Performed on 11 October 2024.                  Six Minute Walk Test done on 10/24/24 at 11:37 AM VIKASHT    Kassie Rashid 1985    Six minute walk test done in my office today by medical assistant Miss: Ines.  Patient don't need any oxygen supplementation at rest or with exercise.  Please see scanned six minute

## 2024-11-20 ENCOUNTER — TELEMEDICINE (OUTPATIENT)
Dept: PSYCHIATRY | Age: 39
End: 2024-11-20

## 2024-11-20 DIAGNOSIS — F43.21 GRIEF: ICD-10-CM

## 2024-11-20 DIAGNOSIS — G47.00 INSOMNIA, UNSPECIFIED TYPE: ICD-10-CM

## 2024-11-20 DIAGNOSIS — F41.1 GAD (GENERALIZED ANXIETY DISORDER): ICD-10-CM

## 2024-11-20 DIAGNOSIS — F31.81 BIPOLAR 2 DISORDER (HCC): Primary | ICD-10-CM

## 2024-11-20 RX ORDER — BUSPIRONE HYDROCHLORIDE 30 MG/1
30 TABLET ORAL 2 TIMES DAILY
Qty: 180 TABLET | Refills: 0 | Status: SHIPPED | OUTPATIENT
Start: 2024-11-20

## 2024-11-20 RX ORDER — FLUOXETINE 40 MG/1
80 CAPSULE ORAL DAILY
Qty: 180 CAPSULE | Refills: 0 | Status: SHIPPED | OUTPATIENT
Start: 2024-11-20

## 2024-11-20 RX ORDER — LAMOTRIGINE 150 MG/1
150 TABLET ORAL DAILY
Qty: 90 TABLET | Refills: 0 | Status: SHIPPED | OUTPATIENT
Start: 2024-11-20

## 2024-11-20 ASSESSMENT — PATIENT HEALTH QUESTIONNAIRE - PHQ9
2. FEELING DOWN, DEPRESSED OR HOPELESS: SEVERAL DAYS
SUM OF ALL RESPONSES TO PHQ QUESTIONS 1-9: 2
SUM OF ALL RESPONSES TO PHQ9 QUESTIONS 1 & 2: 2
2. FEELING DOWN, DEPRESSED OR HOPELESS: SEVERAL DAYS
SUM OF ALL RESPONSES TO PHQ9 QUESTIONS 1 & 2: 2
1. LITTLE INTEREST OR PLEASURE IN DOING THINGS: SEVERAL DAYS
1. LITTLE INTEREST OR PLEASURE IN DOING THINGS: SEVERAL DAYS

## 2024-11-20 ASSESSMENT — ANXIETY QUESTIONNAIRES
5. BEING SO RESTLESS THAT IT IS HARD TO SIT STILL: SEVERAL DAYS
4. TROUBLE RELAXING: SEVERAL DAYS
5. BEING SO RESTLESS THAT IT IS HARD TO SIT STILL: SEVERAL DAYS
IF YOU CHECKED OFF ANY PROBLEMS ON THIS QUESTIONNAIRE, HOW DIFFICULT HAVE THESE PROBLEMS MADE IT FOR YOU TO DO YOUR WORK, TAKE CARE OF THINGS AT HOME, OR GET ALONG WITH OTHER PEOPLE: SOMEWHAT DIFFICULT
GAD7 TOTAL SCORE: 8
7. FEELING AFRAID AS IF SOMETHING AWFUL MIGHT HAPPEN: SEVERAL DAYS
6. BECOMING EASILY ANNOYED OR IRRITABLE: SEVERAL DAYS
4. TROUBLE RELAXING: SEVERAL DAYS
IF YOU CHECKED OFF ANY PROBLEMS ON THIS QUESTIONNAIRE, HOW DIFFICULT HAVE THESE PROBLEMS MADE IT FOR YOU TO DO YOUR WORK, TAKE CARE OF THINGS AT HOME, OR GET ALONG WITH OTHER PEOPLE: SOMEWHAT DIFFICULT
7. FEELING AFRAID AS IF SOMETHING AWFUL MIGHT HAPPEN: SEVERAL DAYS
1. FEELING NERVOUS, ANXIOUS, OR ON EDGE: MORE THAN HALF THE DAYS
2. NOT BEING ABLE TO STOP OR CONTROL WORRYING: SEVERAL DAYS
3. WORRYING TOO MUCH ABOUT DIFFERENT THINGS: SEVERAL DAYS
1. FEELING NERVOUS, ANXIOUS, OR ON EDGE: MORE THAN HALF THE DAYS
6. BECOMING EASILY ANNOYED OR IRRITABLE: SEVERAL DAYS
2. NOT BEING ABLE TO STOP OR CONTROL WORRYING: SEVERAL DAYS
3. WORRYING TOO MUCH ABOUT DIFFERENT THINGS: SEVERAL DAYS

## 2024-11-20 NOTE — PROGRESS NOTES
[facility]  [address]  [place] [place] [address]  Dept: [phone]  Dept Fax: [phone]  Loc: [phone]    Visit Date: 11/20/2024     Kassie Rashid, was evaluated through a synchronous (real-time) audio-video encounter. The patient (or guardian if applicable) is aware that this is a billable service, which includes applicable co-pays. This Virtual Visit was conducted with patient's (and/or legal guardian's) consent. Patient identification was verified, and a caregiver was present when appropriate.   The patient was located at Other: [place]  Provider was located at Home (Appt Dept State): [place]  Confirm you are appropriately licensed, registered, or certified to deliver care in the state where the patient is located as indicated above.       SUBJECTIVE DATA     CHIEF COMPLAINT:    Chief Complaint   Patient presents with    Mental Health Problem    Follow-up     History obtained from: patient    HISTORY OF PRESENT ILLNESS:    Kassie Rashid /is a 39 y.o. female who presents via tele-health audiovisual visit to follow-up on complaints of depression and anxiety. Her last visit was 08/21/2024 as a telehealth visit.    States she has had some stressors  -following father's passing, mother has had some health issues but those have improved and mother is doing pretty well overall    States \"I'm doing okay. No worse. No better.\"  -states she has been very busy  -hasn't noticed any significant shift in mood  -no depression  -no \"breakdowns\"  -embracing the \"idea that you have to take it day by day.\"  -states \"it has been better but not significantly. It has been better.\"  -tries to keep self busy so that she doesn't get into her own mind (I.e. over thinking)  -her awareness of self and emotions is much better  -easier to redirect self and thoughts    Feels like she is getting answers to some questions she has had about her physical health    Did have

## 2024-12-02 RX ORDER — HYDROXYZINE PAMOATE 25 MG/1
25-50 CAPSULE ORAL 3 TIMES DAILY PRN
Qty: 540 CAPSULE | OUTPATIENT
Start: 2024-12-02

## 2025-02-13 NOTE — PROGRESS NOTES
Alcohol use: Yes     Comment: 1 drink/month MAX       Subjective:      Review of Systems  has been obtained. Pertinent positives are noted above.  See HPI for further details. Review of systems otherwise negative.     Objective:   /80 (Site: Right Upper Arm, Position: Sitting)   Pulse 91   Temp 97.5 °F (36.4 °C) (Infrared)   Resp 18   Ht 1.753 m (5' 9.02\")   Wt 120.9 kg (266 lb 9.6 oz)   SpO2 98%   BMI 39.35 kg/m²     Physical Exam   GENERAL: alert and oriented to person, place and time, well developed and well- nourished, in no acute distress.  Skin: warm and dry, no rash or erythema.  Head: Normocephalic and atraumatic.   EYE:  Ocular motility NL. No erythema or infection.  EARS:  External ears have a normal appearance with no masses, lesions, or scars. TM flat, intact without retraction. Middle ear space is clear.  NOSE: Normal external Anatomy. See endoscope results.  Mouth: Lips Free from lesions, ulcers, blisters, or growths, tongue in the midline. Uvula midline. Pharyngeal walls and tonsillar fossae without abnormalities.No tonsillar exudate or tonsillar abscesses.   NECK: supple and non-tender. No lymphadenopathy on neck palpation.No masses, tenderness, or enlargement of thyroid on palpation.  NEUROLOGIC:  Facial nerve function grade I. Other cranial nerves grossly WNL. Normal Romberg test. Coordination is intact. Finger-to-nose testing is performed smoothly and accurately without dysmetria.     Procedure Note  Endoscopy Type:  Nasal Endoscopy    Indications:    Anesthesia: Afrin    Procedure Details:    The procedure was explained to the patient who verbally agreed to proceed. The patient was placed in the sitting position.  The zero degree telescope was passed along the left nasal floor to the nasopharynx.  It was then passed into the region of the middle meatus, middle turbinate, and the sphenoethmoid region. An identical procedure was performed on the right side.  The following findings were

## 2025-02-14 ENCOUNTER — OFFICE VISIT (OUTPATIENT)
Dept: ENT CLINIC | Age: 40
End: 2025-02-14

## 2025-02-14 VITALS
RESPIRATION RATE: 18 BRPM | SYSTOLIC BLOOD PRESSURE: 120 MMHG | HEIGHT: 69 IN | HEART RATE: 91 BPM | BODY MASS INDEX: 39.49 KG/M2 | WEIGHT: 266.6 LBS | TEMPERATURE: 97.5 F | OXYGEN SATURATION: 98 % | DIASTOLIC BLOOD PRESSURE: 80 MMHG

## 2025-02-14 DIAGNOSIS — J34.3 HYPERTROPHY OF BOTH INFERIOR NASAL TURBINATES: ICD-10-CM

## 2025-02-14 DIAGNOSIS — J32.9 CHRONIC SINUSITIS, UNSPECIFIED LOCATION: ICD-10-CM

## 2025-02-14 DIAGNOSIS — J32.9 RECURRENT SINUSITIS: ICD-10-CM

## 2025-02-14 DIAGNOSIS — J34.2 DEVIATED NASAL SEPTUM: Primary | ICD-10-CM

## 2025-02-14 RX ORDER — RESVER/WINE/BFL/GRPSD/PC/C/POM 200MG-60MG
1 CAPSULE ORAL DAILY
COMMUNITY
Start: 2025-01-24

## 2025-02-19 ENCOUNTER — TELEMEDICINE (OUTPATIENT)
Dept: PSYCHIATRY | Age: 40
End: 2025-02-19

## 2025-02-19 DIAGNOSIS — F31.81 BIPOLAR 2 DISORDER (HCC): Primary | ICD-10-CM

## 2025-02-19 DIAGNOSIS — F43.0 ACUTE STRESS REACTION: ICD-10-CM

## 2025-02-19 DIAGNOSIS — F41.1 GAD (GENERALIZED ANXIETY DISORDER): ICD-10-CM

## 2025-02-19 RX ORDER — HYDROXYZINE PAMOATE 25 MG/1
25-50 CAPSULE ORAL 3 TIMES DAILY PRN
Qty: 270 CAPSULE | Refills: 1 | Status: SHIPPED | OUTPATIENT
Start: 2025-02-19

## 2025-02-19 RX ORDER — FLUOXETINE HYDROCHLORIDE 40 MG/1
80 CAPSULE ORAL DAILY
Qty: 180 CAPSULE | Refills: 0 | Status: SHIPPED | OUTPATIENT
Start: 2025-02-19

## 2025-02-19 RX ORDER — LAMOTRIGINE 150 MG/1
150 TABLET ORAL DAILY
Qty: 90 TABLET | Refills: 0 | Status: SHIPPED | OUTPATIENT
Start: 2025-02-19

## 2025-02-19 RX ORDER — BUSPIRONE HYDROCHLORIDE 30 MG/1
30 TABLET ORAL 2 TIMES DAILY
Qty: 180 TABLET | Refills: 0 | Status: SHIPPED | OUTPATIENT
Start: 2025-02-19

## 2025-02-19 ASSESSMENT — PATIENT HEALTH QUESTIONNAIRE - PHQ9
10. IF YOU CHECKED OFF ANY PROBLEMS, HOW DIFFICULT HAVE THESE PROBLEMS MADE IT FOR YOU TO DO YOUR WORK, TAKE CARE OF THINGS AT HOME, OR GET ALONG WITH OTHER PEOPLE: EXTREMELY DIFFICULT
SUM OF ALL RESPONSES TO PHQ QUESTIONS 1-9: 20
5. POOR APPETITE OR OVEREATING: NEARLY EVERY DAY
SUM OF ALL RESPONSES TO PHQ QUESTIONS 1-9: 20
4. FEELING TIRED OR HAVING LITTLE ENERGY: MORE THAN HALF THE DAYS
1. LITTLE INTEREST OR PLEASURE IN DOING THINGS: NEARLY EVERY DAY
4. FEELING TIRED OR HAVING LITTLE ENERGY: MORE THAN HALF THE DAYS
3. TROUBLE FALLING OR STAYING ASLEEP: NEARLY EVERY DAY
SUM OF ALL RESPONSES TO PHQ9 QUESTIONS 1 & 2: 6
SUM OF ALL RESPONSES TO PHQ9 QUESTIONS 1 & 2: 6
2. FEELING DOWN, DEPRESSED OR HOPELESS: NEARLY EVERY DAY
SUM OF ALL RESPONSES TO PHQ QUESTIONS 1-9: 20
10. IF YOU CHECKED OFF ANY PROBLEMS, HOW DIFFICULT HAVE THESE PROBLEMS MADE IT FOR YOU TO DO YOUR WORK, TAKE CARE OF THINGS AT HOME, OR GET ALONG WITH OTHER PEOPLE: EXTREMELY DIFFICULT
2. FEELING DOWN, DEPRESSED OR HOPELESS: NEARLY EVERY DAY
1. LITTLE INTEREST OR PLEASURE IN DOING THINGS: NEARLY EVERY DAY
7. TROUBLE CONCENTRATING ON THINGS, SUCH AS READING THE NEWSPAPER OR WATCHING TELEVISION: NEARLY EVERY DAY
3. TROUBLE FALLING OR STAYING ASLEEP: NEARLY EVERY DAY
9. THOUGHTS THAT YOU WOULD BE BETTER OFF DEAD, OR OF HURTING YOURSELF: NOT AT ALL
8. MOVING OR SPEAKING SO SLOWLY THAT OTHER PEOPLE COULD HAVE NOTICED. OR THE OPPOSITE, BEING SO FIGETY OR RESTLESS THAT YOU HAVE BEEN MOVING AROUND A LOT MORE THAN USUAL: MORE THAN HALF THE DAYS
6. FEELING BAD ABOUT YOURSELF - OR THAT YOU ARE A FAILURE OR HAVE LET YOURSELF OR YOUR FAMILY DOWN: SEVERAL DAYS
SUM OF ALL RESPONSES TO PHQ QUESTIONS 1-9: 20
9. THOUGHTS THAT YOU WOULD BE BETTER OFF DEAD, OR OF HURTING YOURSELF: NOT AT ALL
SUM OF ALL RESPONSES TO PHQ QUESTIONS 1-9: 20
8. MOVING OR SPEAKING SO SLOWLY THAT OTHER PEOPLE COULD HAVE NOTICED. OR THE OPPOSITE - BEING SO FIDGETY OR RESTLESS THAT YOU HAVE BEEN MOVING AROUND A LOT MORE THAN USUAL: MORE THAN HALF THE DAYS
7. TROUBLE CONCENTRATING ON THINGS, SUCH AS READING THE NEWSPAPER OR WATCHING TELEVISION: NEARLY EVERY DAY
5. POOR APPETITE OR OVEREATING: NEARLY EVERY DAY
6. FEELING BAD ABOUT YOURSELF - OR THAT YOU ARE A FAILURE OR HAVE LET YOURSELF OR YOUR FAMILY DOWN: SEVERAL DAYS

## 2025-02-19 ASSESSMENT — ANXIETY QUESTIONNAIRES
3. WORRYING TOO MUCH ABOUT DIFFERENT THINGS: NEARLY EVERY DAY
3. WORRYING TOO MUCH ABOUT DIFFERENT THINGS: NEARLY EVERY DAY
5. BEING SO RESTLESS THAT IT IS HARD TO SIT STILL: SEVERAL DAYS
1. FEELING NERVOUS, ANXIOUS, OR ON EDGE: NEARLY EVERY DAY
4. TROUBLE RELAXING: NEARLY EVERY DAY
1. FEELING NERVOUS, ANXIOUS, OR ON EDGE: NEARLY EVERY DAY
4. TROUBLE RELAXING: NEARLY EVERY DAY
IF YOU CHECKED OFF ANY PROBLEMS ON THIS QUESTIONNAIRE, HOW DIFFICULT HAVE THESE PROBLEMS MADE IT FOR YOU TO DO YOUR WORK, TAKE CARE OF THINGS AT HOME, OR GET ALONG WITH OTHER PEOPLE: EXTREMELY DIFFICULT
IF YOU CHECKED OFF ANY PROBLEMS ON THIS QUESTIONNAIRE, HOW DIFFICULT HAVE THESE PROBLEMS MADE IT FOR YOU TO DO YOUR WORK, TAKE CARE OF THINGS AT HOME, OR GET ALONG WITH OTHER PEOPLE: EXTREMELY DIFFICULT
2. NOT BEING ABLE TO STOP OR CONTROL WORRYING: NEARLY EVERY DAY
GAD7 TOTAL SCORE: 17
7. FEELING AFRAID AS IF SOMETHING AWFUL MIGHT HAPPEN: NEARLY EVERY DAY
6. BECOMING EASILY ANNOYED OR IRRITABLE: SEVERAL DAYS
2. NOT BEING ABLE TO STOP OR CONTROL WORRYING: NEARLY EVERY DAY
7. FEELING AFRAID AS IF SOMETHING AWFUL MIGHT HAPPEN: NEARLY EVERY DAY
5. BEING SO RESTLESS THAT IT IS HARD TO SIT STILL: SEVERAL DAYS
6. BECOMING EASILY ANNOYED OR IRRITABLE: SEVERAL DAYS

## 2025-02-19 ASSESSMENT — COLUMBIA-SUICIDE SEVERITY RATING SCALE - C-SSRS
1. IN THE PAST MONTH, HAVE YOU WISHED YOU WERE DEAD OR WISHED YOU COULD GO TO SLEEP AND NOT WAKE UP?: NO
6. IN YOUR LIFETIME, HAVE YOU EVER DONE ANYTHING, STARTED TO DO ANYTHING, OR PREPARED TO DO ANYTHING TO END YOUR LIFE?: NO
2. IN THE PAST MONTH, HAVE YOU ACTUALLY HAD ANY THOUGHTS OF KILLING YOURSELF?: NO

## 2025-02-19 NOTE — PROGRESS NOTES
[facility]  [address]  [facility] [address]  Dept: [phone]  Dept Fax: [phone]  Loc: [phone]    Visit Date: 2/19/2025     Kassie Rashid, was evaluated through a synchronous (real-time) audio-video encounter. The patient (or guardian if applicable) is aware that this is a billable service, which includes applicable co-pays. This Virtual Visit was conducted with patient's (and/or legal guardian's) consent. Patient identification was verified, and a caregiver was present when appropriate.   The patient was located at Home: [address]   [place] [address]  Provider was located at Home (Appt Dept State): [place]      SUBJECTIVE DATA     CHIEF COMPLAINT:    Chief Complaint   Patient presents with    Mental Health Problem    Follow-up     History obtained from: patient    HISTORY OF PRESENT ILLNESS:    Kassie Rashid /is a 39 y.o. female who presents via tele-health audiovisual visit to follow-up on complaints of depression and anxiety. Her last visit was 11/20/2024 as a telehealth visit.    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 States \"you don't know when the hammer is going to drop.\"  -she has been through something similar in the past; she was transferred to a different position and it was \"horrible.\" States she is terrified this may occur again    States she loves her new position within the [employer]  -states her new position is not an \"essential\" position so she feels more vulnerable   -has had this new position for about 1 year and it has been really good  -she is considering retiring with disability in order to keep her benefits  -she believes she will be transferred to her old position, which she doesn't want, or she will be fired

## 2025-03-12 ENCOUNTER — TELEMEDICINE (OUTPATIENT)
Dept: PSYCHIATRY | Age: 40
End: 2025-03-12
Payer: COMMERCIAL

## 2025-03-12 DIAGNOSIS — F41.1 GAD (GENERALIZED ANXIETY DISORDER): Primary | ICD-10-CM

## 2025-03-12 DIAGNOSIS — F43.0 ACUTE STRESS REACTION: ICD-10-CM

## 2025-03-12 DIAGNOSIS — F31.81 BIPOLAR 2 DISORDER (HCC): ICD-10-CM

## 2025-03-12 PROCEDURE — 99214 OFFICE O/P EST MOD 30 MIN: CPT | Performed by: NURSE PRACTITIONER

## 2025-03-12 PROCEDURE — 90836 PSYTX W PT W E/M 45 MIN: CPT | Performed by: NURSE PRACTITIONER

## 2025-03-12 ASSESSMENT — PATIENT HEALTH QUESTIONNAIRE - PHQ9
6. FEELING BAD ABOUT YOURSELF - OR THAT YOU ARE A FAILURE OR HAVE LET YOURSELF OR YOUR FAMILY DOWN: SEVERAL DAYS
3. TROUBLE FALLING OR STAYING ASLEEP: NEARLY EVERY DAY
4. FEELING TIRED OR HAVING LITTLE ENERGY: NEARLY EVERY DAY
SUM OF ALL RESPONSES TO PHQ QUESTIONS 1-9: 19
6. FEELING BAD ABOUT YOURSELF - OR THAT YOU ARE A FAILURE OR HAVE LET YOURSELF OR YOUR FAMILY DOWN: SEVERAL DAYS
SUM OF ALL RESPONSES TO PHQ QUESTIONS 1-9: 19
2. FEELING DOWN, DEPRESSED OR HOPELESS: MORE THAN HALF THE DAYS
SUM OF ALL RESPONSES TO PHQ QUESTIONS 1-9: 19
SUM OF ALL RESPONSES TO PHQ QUESTIONS 1-9: 19
3. TROUBLE FALLING OR STAYING ASLEEP: NEARLY EVERY DAY
4. FEELING TIRED OR HAVING LITTLE ENERGY: NEARLY EVERY DAY
5. POOR APPETITE OR OVEREATING: NEARLY EVERY DAY
1. LITTLE INTEREST OR PLEASURE IN DOING THINGS: MORE THAN HALF THE DAYS
7. TROUBLE CONCENTRATING ON THINGS, SUCH AS READING THE NEWSPAPER OR WATCHING TELEVISION: NEARLY EVERY DAY
5. POOR APPETITE OR OVEREATING: NEARLY EVERY DAY
7. TROUBLE CONCENTRATING ON THINGS, SUCH AS READING THE NEWSPAPER OR WATCHING TELEVISION: NEARLY EVERY DAY
SUM OF ALL RESPONSES TO PHQ QUESTIONS 1-9: 19
2. FEELING DOWN, DEPRESSED OR HOPELESS: MORE THAN HALF THE DAYS
9. THOUGHTS THAT YOU WOULD BE BETTER OFF DEAD, OR OF HURTING YOURSELF: NOT AT ALL
10. IF YOU CHECKED OFF ANY PROBLEMS, HOW DIFFICULT HAVE THESE PROBLEMS MADE IT FOR YOU TO DO YOUR WORK, TAKE CARE OF THINGS AT HOME, OR GET ALONG WITH OTHER PEOPLE: EXTREMELY DIFFICULT
8. MOVING OR SPEAKING SO SLOWLY THAT OTHER PEOPLE COULD HAVE NOTICED. OR THE OPPOSITE, BEING SO FIGETY OR RESTLESS THAT YOU HAVE BEEN MOVING AROUND A LOT MORE THAN USUAL: MORE THAN HALF THE DAYS
1. LITTLE INTEREST OR PLEASURE IN DOING THINGS: MORE THAN HALF THE DAYS
9. THOUGHTS THAT YOU WOULD BE BETTER OFF DEAD, OR OF HURTING YOURSELF: NOT AT ALL
10. IF YOU CHECKED OFF ANY PROBLEMS, HOW DIFFICULT HAVE THESE PROBLEMS MADE IT FOR YOU TO DO YOUR WORK, TAKE CARE OF THINGS AT HOME, OR GET ALONG WITH OTHER PEOPLE: EXTREMELY DIFFICULT
8. MOVING OR SPEAKING SO SLOWLY THAT OTHER PEOPLE COULD HAVE NOTICED. OR THE OPPOSITE - BEING SO FIDGETY OR RESTLESS THAT YOU HAVE BEEN MOVING AROUND A LOT MORE THAN USUAL: MORE THAN HALF THE DAYS

## 2025-03-12 ASSESSMENT — ANXIETY QUESTIONNAIRES
5. BEING SO RESTLESS THAT IT IS HARD TO SIT STILL: SEVERAL DAYS
3. WORRYING TOO MUCH ABOUT DIFFERENT THINGS: NEARLY EVERY DAY
2. NOT BEING ABLE TO STOP OR CONTROL WORRYING: NEARLY EVERY DAY
7. FEELING AFRAID AS IF SOMETHING AWFUL MIGHT HAPPEN: NEARLY EVERY DAY
6. BECOMING EASILY ANNOYED OR IRRITABLE: MORE THAN HALF THE DAYS
1. FEELING NERVOUS, ANXIOUS, OR ON EDGE: NEARLY EVERY DAY
GAD7 TOTAL SCORE: 18
4. TROUBLE RELAXING: NEARLY EVERY DAY
5. BEING SO RESTLESS THAT IT IS HARD TO SIT STILL: SEVERAL DAYS
7. FEELING AFRAID AS IF SOMETHING AWFUL MIGHT HAPPEN: NEARLY EVERY DAY
3. WORRYING TOO MUCH ABOUT DIFFERENT THINGS: NEARLY EVERY DAY
4. TROUBLE RELAXING: NEARLY EVERY DAY
2. NOT BEING ABLE TO STOP OR CONTROL WORRYING: NEARLY EVERY DAY
6. BECOMING EASILY ANNOYED OR IRRITABLE: MORE THAN HALF THE DAYS
1. FEELING NERVOUS, ANXIOUS, OR ON EDGE: NEARLY EVERY DAY

## 2025-03-12 NOTE — PROGRESS NOTES
the marijuana for the IBS symptoms.     Continues with stressors related to her employment and the political atmosphere.    She is getting more into the details of her new position.   -she was transferred into this position - states \"It was not my choice\"  -states she doesn't think she is going to do well in this position  -she is very worried about her new position   -feels like she is already falling behind in her job duties  -states the expectation is to manage and be productive  -states the expectations are that she be available immediately - states \"I have to be available to them all the time.\"  -states it is very difficult to set any boundaries because it would be considered insubordination   -feels like she is getting burnt out at work    She did find out she has BRIANNA  -she was on supplemental oxygen, but no longer using that because she couldn't sleep due to the sound  -states she felt worse using the oxygen because she was very restless  -she was told she needs to have nasal surgery  -admits she gets very poor sleep quality but sleeps better without the oxygen than she felt like she was sleeping with the oxygen      Denies suicidal ideations, intent, plan. No homicidal ideations, intent, plan. No audiovisual hallucinations.      HPI    Adverse reactions from psychotropic medications:  None current      Current Psychiatric Review of Systems         Tish or Hypomania:  None      Panic Attacks:  no     Phobias:  no     Obsessions and Compulsions:  Stable and well controlled     Body or Vocal Tics:  no     Hallucinations:  no     Delusions:  no    SOCIAL HISTORY:  Patient was born in  [place], [place]  and raised by her biological parents      Social History     Socioeconomic History    Marital status:      Spouse name: Adrián    Number of children: 2    Years of education: Not on file    Highest education level: Bachelor's degree (e.g., BA, AB, BS)   Occupational History    Occupation: District

## 2025-03-26 ENCOUNTER — TELEMEDICINE (OUTPATIENT)
Dept: PSYCHOLOGY | Age: 40
End: 2025-03-26
Payer: COMMERCIAL

## 2025-03-26 DIAGNOSIS — F39 MOOD DISORDER: ICD-10-CM

## 2025-03-26 DIAGNOSIS — F43.9 TRAUMA AND STRESSOR-RELATED DISORDER: Primary | ICD-10-CM

## 2025-03-26 DIAGNOSIS — F60.5 OBSESSIVE COMPULSIVE PERSONALITY DISORDER (HCC): ICD-10-CM

## 2025-03-26 DIAGNOSIS — F50.89 OTHER SPECIFIED EATING DISORDER: ICD-10-CM

## 2025-03-26 PROCEDURE — 90832 PSYTX W PT 30 MINUTES: CPT | Performed by: PSYCHOLOGIST

## 2025-03-26 NOTE — PROGRESS NOTES
Kassie Rashid, was evaluated through a synchronous (real-time) audio-video encounter. The patient (or guardian if applicable) is aware that this is a billable service, which includes applicable co-pays. This Virtual Visit was conducted with patient's (and/or legal guardian's) consent. Patient identification was verified, and a caregiver was present when appropriate.   The patient was located: [place]  Provider was located at Facility (Appt Dept): [address] [facility] [address]  [place] [address]        PSYCHOLOGICAL FOLLOW-UP FOR mood and eating behaviors     History of Presenting Illness:   Kassie Rashid is a 39 year-old, female, referred for a psychiatric evaluation for binge eating by BERNARD Rothman CNP.  She is currently following with Kareen Wright CNP for medication management. She is following up with psychology to learn adaptive coping strategies to manage stress, mood, and binge eating.     Patient was last seen in by writer in September 2024. Of note, there were significant connection issues throughout today's session.    Assessment:  She reports she had been doing overall well, but then gets derailed. She recognizes that this is a pattern for her. She reports she changed her job duties so she only had to focus on one area.  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     She reports this has increased stress, increased negative symptoms of mood, not wanting to leave the house, and avoidance. She reports her job is more \"scattered\" now which makes it harder to regulate her mood.      She reports she potentially could get federal disability which is different from SSDI. This does not mean you are incapable of working, but rather you are incapable of working the job you are in. You would retire from the position you are currently in, not from working in general. IF she did this, she would apply for a job position outside of the federal

## 2025-06-11 ENCOUNTER — TELEMEDICINE (OUTPATIENT)
Dept: PSYCHIATRY | Age: 40
End: 2025-06-11
Payer: COMMERCIAL

## 2025-06-11 DIAGNOSIS — F31.81 BIPOLAR 2 DISORDER (HCC): Primary | ICD-10-CM

## 2025-06-11 DIAGNOSIS — F43.0 ACUTE STRESS REACTION: ICD-10-CM

## 2025-06-11 DIAGNOSIS — F41.1 GAD (GENERALIZED ANXIETY DISORDER): ICD-10-CM

## 2025-06-11 PROCEDURE — 99214 OFFICE O/P EST MOD 30 MIN: CPT | Performed by: NURSE PRACTITIONER

## 2025-06-11 PROCEDURE — 90833 PSYTX W PT W E/M 30 MIN: CPT | Performed by: NURSE PRACTITIONER

## 2025-06-11 RX ORDER — FLUOXETINE HYDROCHLORIDE 40 MG/1
80 CAPSULE ORAL DAILY
Qty: 180 CAPSULE | Refills: 0 | Status: SHIPPED | OUTPATIENT
Start: 2025-06-11

## 2025-06-11 RX ORDER — HYDROXYZINE PAMOATE 25 MG/1
25-50 CAPSULE ORAL 3 TIMES DAILY PRN
Qty: 270 CAPSULE | Refills: 1 | Status: SHIPPED | OUTPATIENT
Start: 2025-06-11

## 2025-06-11 RX ORDER — BUSPIRONE HYDROCHLORIDE 30 MG/1
30 TABLET ORAL 2 TIMES DAILY
Qty: 180 TABLET | Refills: 0 | Status: SHIPPED | OUTPATIENT
Start: 2025-06-11

## 2025-06-11 RX ORDER — LAMOTRIGINE 150 MG/1
150 TABLET ORAL DAILY
Qty: 90 TABLET | Refills: 0 | Status: SHIPPED | OUTPATIENT
Start: 2025-06-11

## 2025-06-11 ASSESSMENT — ANXIETY QUESTIONNAIRES
5. BEING SO RESTLESS THAT IT IS HARD TO SIT STILL: NOT AT ALL
2. NOT BEING ABLE TO STOP OR CONTROL WORRYING: SEVERAL DAYS
7. FEELING AFRAID AS IF SOMETHING AWFUL MIGHT HAPPEN: MORE THAN HALF THE DAYS
IF YOU CHECKED OFF ANY PROBLEMS ON THIS QUESTIONNAIRE, HOW DIFFICULT HAVE THESE PROBLEMS MADE IT FOR YOU TO DO YOUR WORK, TAKE CARE OF THINGS AT HOME, OR GET ALONG WITH OTHER PEOPLE: SOMEWHAT DIFFICULT
6. BECOMING EASILY ANNOYED OR IRRITABLE: MORE THAN HALF THE DAYS
6. BECOMING EASILY ANNOYED OR IRRITABLE: MORE THAN HALF THE DAYS
7. FEELING AFRAID AS IF SOMETHING AWFUL MIGHT HAPPEN: MORE THAN HALF THE DAYS
2. NOT BEING ABLE TO STOP OR CONTROL WORRYING: SEVERAL DAYS
5. BEING SO RESTLESS THAT IT IS HARD TO SIT STILL: NOT AT ALL
3. WORRYING TOO MUCH ABOUT DIFFERENT THINGS: SEVERAL DAYS
3. WORRYING TOO MUCH ABOUT DIFFERENT THINGS: SEVERAL DAYS
4. TROUBLE RELAXING: SEVERAL DAYS
IF YOU CHECKED OFF ANY PROBLEMS ON THIS QUESTIONNAIRE, HOW DIFFICULT HAVE THESE PROBLEMS MADE IT FOR YOU TO DO YOUR WORK, TAKE CARE OF THINGS AT HOME, OR GET ALONG WITH OTHER PEOPLE: SOMEWHAT DIFFICULT
GAD7 TOTAL SCORE: 9
1. FEELING NERVOUS, ANXIOUS, OR ON EDGE: MORE THAN HALF THE DAYS
4. TROUBLE RELAXING: SEVERAL DAYS
1. FEELING NERVOUS, ANXIOUS, OR ON EDGE: MORE THAN HALF THE DAYS

## 2025-06-11 ASSESSMENT — PATIENT HEALTH QUESTIONNAIRE - PHQ9
SUM OF ALL RESPONSES TO PHQ QUESTIONS 1-9: 15
SUM OF ALL RESPONSES TO PHQ QUESTIONS 1-9: 15
1. LITTLE INTEREST OR PLEASURE IN DOING THINGS: MORE THAN HALF THE DAYS
8. MOVING OR SPEAKING SO SLOWLY THAT OTHER PEOPLE COULD HAVE NOTICED. OR THE OPPOSITE, BEING SO FIGETY OR RESTLESS THAT YOU HAVE BEEN MOVING AROUND A LOT MORE THAN USUAL: SEVERAL DAYS
SUM OF ALL RESPONSES TO PHQ QUESTIONS 1-9: 15
3. TROUBLE FALLING OR STAYING ASLEEP: MORE THAN HALF THE DAYS
7. TROUBLE CONCENTRATING ON THINGS, SUCH AS READING THE NEWSPAPER OR WATCHING TELEVISION: MORE THAN HALF THE DAYS
3. TROUBLE FALLING OR STAYING ASLEEP: MORE THAN HALF THE DAYS
10. IF YOU CHECKED OFF ANY PROBLEMS, HOW DIFFICULT HAVE THESE PROBLEMS MADE IT FOR YOU TO DO YOUR WORK, TAKE CARE OF THINGS AT HOME, OR GET ALONG WITH OTHER PEOPLE: SOMEWHAT DIFFICULT
5. POOR APPETITE OR OVEREATING: MORE THAN HALF THE DAYS
SUM OF ALL RESPONSES TO PHQ QUESTIONS 1-9: 15
2. FEELING DOWN, DEPRESSED OR HOPELESS: MORE THAN HALF THE DAYS
9. THOUGHTS THAT YOU WOULD BE BETTER OFF DEAD, OR OF HURTING YOURSELF: NOT AT ALL
9. THOUGHTS THAT YOU WOULD BE BETTER OFF DEAD, OR OF HURTING YOURSELF: NOT AT ALL
2. FEELING DOWN, DEPRESSED OR HOPELESS: MORE THAN HALF THE DAYS
5. POOR APPETITE OR OVEREATING: MORE THAN HALF THE DAYS
4. FEELING TIRED OR HAVING LITTLE ENERGY: MORE THAN HALF THE DAYS
10. IF YOU CHECKED OFF ANY PROBLEMS, HOW DIFFICULT HAVE THESE PROBLEMS MADE IT FOR YOU TO DO YOUR WORK, TAKE CARE OF THINGS AT HOME, OR GET ALONG WITH OTHER PEOPLE: SOMEWHAT DIFFICULT
SUM OF ALL RESPONSES TO PHQ QUESTIONS 1-9: 15
6. FEELING BAD ABOUT YOURSELF - OR THAT YOU ARE A FAILURE OR HAVE LET YOURSELF OR YOUR FAMILY DOWN: MORE THAN HALF THE DAYS
1. LITTLE INTEREST OR PLEASURE IN DOING THINGS: MORE THAN HALF THE DAYS
7. TROUBLE CONCENTRATING ON THINGS, SUCH AS READING THE NEWSPAPER OR WATCHING TELEVISION: MORE THAN HALF THE DAYS
6. FEELING BAD ABOUT YOURSELF - OR THAT YOU ARE A FAILURE OR HAVE LET YOURSELF OR YOUR FAMILY DOWN: MORE THAN HALF THE DAYS
4. FEELING TIRED OR HAVING LITTLE ENERGY: MORE THAN HALF THE DAYS
8. MOVING OR SPEAKING SO SLOWLY THAT OTHER PEOPLE COULD HAVE NOTICED. OR THE OPPOSITE - BEING SO FIDGETY OR RESTLESS THAT YOU HAVE BEEN MOVING AROUND A LOT MORE THAN USUAL: SEVERAL DAYS
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
